# Patient Record
Sex: MALE | Race: BLACK OR AFRICAN AMERICAN | Employment: OTHER | URBAN - METROPOLITAN AREA
[De-identification: names, ages, dates, MRNs, and addresses within clinical notes are randomized per-mention and may not be internally consistent; named-entity substitution may affect disease eponyms.]

---

## 2023-03-04 PROBLEM — R35.0 URINARY FREQUENCY: Status: ACTIVE | Noted: 2023-03-04

## 2023-03-04 PROBLEM — N41.9 PROSTATITIS: Status: ACTIVE | Noted: 2023-03-04

## 2023-03-04 PROBLEM — I10 HYPERTENSION: Status: ACTIVE | Noted: 2023-03-04

## 2023-03-04 RX ORDER — CIPROFLOXACIN 500 MG/1
1 TABLET ORAL EVERY 12 HOURS
COMMUNITY
Start: 2016-05-17

## 2023-03-04 RX ORDER — HYDROCHLOROTHIAZIDE 25 MG/1
1 TABLET ORAL DAILY
COMMUNITY
Start: 2016-04-18

## 2023-10-09 ENCOUNTER — LAB (OUTPATIENT)
Dept: LAB | Facility: LAB | Age: 53
End: 2023-10-09
Payer: MEDICARE

## 2023-10-09 DIAGNOSIS — E11.65 TYPE 2 DIABETES MELLITUS WITH HYPERGLYCEMIA (MULTI): ICD-10-CM

## 2023-10-09 DIAGNOSIS — R20.2 PARESTHESIA OF SKIN: ICD-10-CM

## 2023-10-09 DIAGNOSIS — R41.3 OTHER AMNESIA: ICD-10-CM

## 2023-10-09 DIAGNOSIS — U09.9 POST COVID-19 CONDITION, UNSPECIFIED: ICD-10-CM

## 2023-10-09 DIAGNOSIS — Z79.899 OTHER LONG TERM (CURRENT) DRUG THERAPY: Primary | ICD-10-CM

## 2023-10-09 DIAGNOSIS — G93.1 ANOXIC BRAIN DAMAGE, NOT ELSEWHERE CLASSIFIED (MULTI): ICD-10-CM

## 2023-10-09 LAB
ALBUMIN SERPL-MCNC: 4.8 G/DL (ref 3.5–5)
ALBUMIN SERPL-MCNC: 5 G/DL (ref 3.5–5)
ALP BLD-CCNC: 286 U/L (ref 35–125)
ALP BLD-CCNC: 288 U/L (ref 35–125)
ALT SERPL-CCNC: 48 U/L (ref 5–40)
ALT SERPL-CCNC: 48 U/L (ref 5–40)
ANION GAP SERPL CALC-SCNC: 13 MMOL/L
ANION GAP SERPL CALC-SCNC: 14 MMOL/L
AST SERPL-CCNC: 31 U/L (ref 5–40)
AST SERPL-CCNC: 31 U/L (ref 5–40)
BASOPHILS # BLD AUTO: 0.08 X10*3/UL (ref 0–0.1)
BASOPHILS NFR BLD AUTO: 0.8 %
BILIRUB DIRECT SERPL-MCNC: <0.2 MG/DL (ref 0–0.2)
BILIRUB SERPL-MCNC: 0.4 MG/DL (ref 0.1–1.2)
BILIRUB SERPL-MCNC: 0.4 MG/DL (ref 0.1–1.2)
BUN SERPL-MCNC: 32 MG/DL (ref 8–25)
BUN SERPL-MCNC: 33 MG/DL (ref 8–25)
CALCIUM SERPL-MCNC: 9.6 MG/DL (ref 8.5–10.4)
CALCIUM SERPL-MCNC: 9.7 MG/DL (ref 8.5–10.4)
CHLORIDE SERPL-SCNC: 103 MMOL/L (ref 97–107)
CHLORIDE SERPL-SCNC: 104 MMOL/L (ref 97–107)
CHOLEST SERPL-MCNC: 307 MG/DL (ref 133–200)
CHOLEST/HDLC SERPL: 5.7 {RATIO}
CO2 SERPL-SCNC: 26 MMOL/L (ref 24–31)
CO2 SERPL-SCNC: 26 MMOL/L (ref 24–31)
CREAT SERPL-MCNC: 2.5 MG/DL (ref 0.4–1.6)
CREAT SERPL-MCNC: 2.5 MG/DL (ref 0.4–1.6)
CRP SERPL-MCNC: <0.3 MG/DL (ref 0–2)
EOSINOPHIL # BLD AUTO: 0.19 X10*3/UL (ref 0–0.7)
EOSINOPHIL NFR BLD AUTO: 1.9 %
ERYTHROCYTE [DISTWIDTH] IN BLOOD BY AUTOMATED COUNT: 13.3 % (ref 11.5–14.5)
ERYTHROCYTE [SEDIMENTATION RATE] IN BLOOD BY WESTERGREN METHOD: 33 MM/H (ref 0–20)
EST. AVERAGE GLUCOSE BLD GHB EST-MCNC: 163 MG/DL
GFR SERPL CREATININE-BSD FRML MDRD: 30 ML/MIN/1.73M*2
GFR SERPL CREATININE-BSD FRML MDRD: 30 ML/MIN/1.73M*2
GLUCOSE SERPL-MCNC: 102 MG/DL (ref 65–99)
GLUCOSE SERPL-MCNC: 104 MG/DL (ref 65–99)
HBA1C MFR BLD: 7.3 %
HCT VFR BLD AUTO: 42.5 % (ref 41–52)
HDLC SERPL-MCNC: 54 MG/DL
HGB BLD-MCNC: 13.6 G/DL (ref 13.5–17.5)
IMM GRANULOCYTES # BLD AUTO: 0.06 X10*3/UL (ref 0–0.7)
IMM GRANULOCYTES NFR BLD AUTO: 0.6 % (ref 0–0.9)
LDLC SERPL CALC-MCNC: 199 MG/DL (ref 65–130)
LYMPHOCYTES # BLD AUTO: 1.95 X10*3/UL (ref 1.2–4.8)
LYMPHOCYTES NFR BLD AUTO: 20 %
MCH RBC QN AUTO: 27.7 PG (ref 26–34)
MCHC RBC AUTO-ENTMCNC: 32 G/DL (ref 32–36)
MCV RBC AUTO: 87 FL (ref 80–100)
MONOCYTES # BLD AUTO: 1.2 X10*3/UL (ref 0.1–1)
MONOCYTES NFR BLD AUTO: 12.3 %
NEUTROPHILS # BLD AUTO: 6.28 X10*3/UL (ref 1.2–7.7)
NEUTROPHILS NFR BLD AUTO: 64.4 %
NRBC BLD-RTO: 0 /100 WBCS (ref 0–0)
PLATELET # BLD AUTO: 391 X10*3/UL (ref 150–450)
PMV BLD AUTO: 10.5 FL (ref 7.5–11.5)
POTASSIUM SERPL-SCNC: 3.8 MMOL/L (ref 3.4–5.1)
POTASSIUM SERPL-SCNC: 3.9 MMOL/L (ref 3.4–5.1)
PROT SERPL-MCNC: 7.9 G/DL (ref 5.9–7.9)
PROT SERPL-MCNC: 8 G/DL (ref 5.9–7.9)
RBC # BLD AUTO: 4.91 X10*6/UL (ref 4.5–5.9)
SODIUM SERPL-SCNC: 143 MMOL/L (ref 133–145)
SODIUM SERPL-SCNC: 143 MMOL/L (ref 133–145)
TRIGL SERPL-MCNC: 271 MG/DL (ref 40–150)
TSH SERPL DL<=0.05 MIU/L-ACNC: 4.09 MIU/L (ref 0.27–4.2)
VIT B12 SERPL-MCNC: 505 PG/ML (ref 211–946)
WBC # BLD AUTO: 9.8 X10*3/UL (ref 4.4–11.3)

## 2023-10-09 PROCEDURE — 86803 HEPATITIS C AB TEST: CPT

## 2023-10-09 PROCEDURE — 84443 ASSAY THYROID STIM HORMONE: CPT

## 2023-10-09 PROCEDURE — 82248 BILIRUBIN DIRECT: CPT

## 2023-10-09 PROCEDURE — 85025 COMPLETE CBC W/AUTO DIFF WBC: CPT

## 2023-10-09 PROCEDURE — 86481 TB AG RESPONSE T-CELL SUSP: CPT

## 2023-10-09 PROCEDURE — 86704 HEP B CORE ANTIBODY TOTAL: CPT

## 2023-10-09 PROCEDURE — 83036 HEMOGLOBIN GLYCOSYLATED A1C: CPT

## 2023-10-09 PROCEDURE — 86140 C-REACTIVE PROTEIN: CPT

## 2023-10-09 PROCEDURE — 86038 ANTINUCLEAR ANTIBODIES: CPT

## 2023-10-09 PROCEDURE — 36415 COLL VENOUS BLD VENIPUNCTURE: CPT

## 2023-10-09 PROCEDURE — 86706 HEP B SURFACE ANTIBODY: CPT

## 2023-10-09 PROCEDURE — 80061 LIPID PANEL: CPT

## 2023-10-09 PROCEDURE — 82607 VITAMIN B-12: CPT

## 2023-10-09 PROCEDURE — 85652 RBC SED RATE AUTOMATED: CPT

## 2023-10-09 PROCEDURE — 80053 COMPREHEN METABOLIC PANEL: CPT

## 2023-10-09 PROCEDURE — 87350 HEPATITIS BE AG IA: CPT

## 2023-10-10 LAB
ANA SER QL HEP2 SUBST: NEGATIVE
HBV CORE AB SER QL: NONREACTIVE
HBV SURFACE AB SER-ACNC: 17.5 MIU/ML
HCV AB SER QL: NONREACTIVE

## 2023-10-11 LAB
HBV E AG SERPL QL IA: NEGATIVE
NIL(NEG) CONTROL SPOT COUNT: NORMAL
PANEL A SPOT COUNT: 0
PANEL B SPOT COUNT: 0
POS CONTROL SPOT COUNT: NORMAL
T-SPOT. TB INTERPRETATION: NEGATIVE

## 2023-11-01 ENCOUNTER — HOSPITAL ENCOUNTER (OUTPATIENT)
Dept: RADIOLOGY | Facility: CLINIC | Age: 53
Discharge: HOME | End: 2023-11-01
Payer: MEDICARE

## 2023-11-01 ENCOUNTER — OFFICE VISIT (OUTPATIENT)
Dept: ORTHOPEDIC SURGERY | Facility: CLINIC | Age: 53
End: 2023-11-01
Payer: MEDICARE

## 2023-11-01 VITALS — BODY MASS INDEX: 41.65 KG/M2 | WEIGHT: 250 LBS | HEIGHT: 65 IN

## 2023-11-01 DIAGNOSIS — M79.672 LEFT FOOT PAIN: ICD-10-CM

## 2023-11-01 DIAGNOSIS — M19.072 OSTEOARTHRITIS OF LEFT ANKLE OR FOOT: ICD-10-CM

## 2023-11-01 DIAGNOSIS — M25.562 LEFT KNEE PAIN, UNSPECIFIED CHRONICITY: ICD-10-CM

## 2023-11-01 DIAGNOSIS — M17.12 PRIMARY OSTEOARTHRITIS OF LEFT KNEE: ICD-10-CM

## 2023-11-01 DIAGNOSIS — M25.572 LEFT ANKLE PAIN, UNSPECIFIED CHRONICITY: ICD-10-CM

## 2023-11-01 DIAGNOSIS — M25.562 LEFT KNEE PAIN, UNSPECIFIED CHRONICITY: Primary | ICD-10-CM

## 2023-11-01 DIAGNOSIS — M19.072 OSTEOARTHRITIS OF LEFT ANKLE AND FOOT: ICD-10-CM

## 2023-11-01 PROBLEM — M19.079 OSTEOARTHRITIS OF ANKLE OR FOOT: Status: ACTIVE | Noted: 2023-11-01

## 2023-11-01 PROCEDURE — 73630 X-RAY EXAM OF FOOT: CPT | Mod: LT,FY

## 2023-11-01 PROCEDURE — 73560 X-RAY EXAM OF KNEE 1 OR 2: CPT | Mod: LT

## 2023-11-01 PROCEDURE — 99203 OFFICE O/P NEW LOW 30 MIN: CPT | Performed by: ORTHOPAEDIC SURGERY

## 2023-11-01 PROCEDURE — 73610 X-RAY EXAM OF ANKLE: CPT | Mod: LT

## 2023-11-01 PROCEDURE — 1036F TOBACCO NON-USER: CPT | Performed by: ORTHOPAEDIC SURGERY

## 2023-11-01 PROCEDURE — 99213 OFFICE O/P EST LOW 20 MIN: CPT | Performed by: ORTHOPAEDIC SURGERY

## 2023-11-01 ASSESSMENT — PAIN - FUNCTIONAL ASSESSMENT: PAIN_FUNCTIONAL_ASSESSMENT: 0-10

## 2023-11-01 ASSESSMENT — PATIENT HEALTH QUESTIONNAIRE - PHQ9
1. LITTLE INTEREST OR PLEASURE IN DOING THINGS: NOT AT ALL
2. FEELING DOWN, DEPRESSED OR HOPELESS: NOT AT ALL
2. FEELING DOWN, DEPRESSED OR HOPELESS: NOT AT ALL
SUM OF ALL RESPONSES TO PHQ9 QUESTIONS 1 AND 2: 0
SUM OF ALL RESPONSES TO PHQ9 QUESTIONS 1 AND 2: 0
1. LITTLE INTEREST OR PLEASURE IN DOING THINGS: NOT AT ALL

## 2023-11-01 ASSESSMENT — ENCOUNTER SYMPTOMS
OCCASIONAL FEELINGS OF UNSTEADINESS: 0
LOSS OF SENSATION IN FEET: 0
DEPRESSION: 0

## 2023-11-01 ASSESSMENT — PAIN DESCRIPTION - DESCRIPTORS: DESCRIPTORS: STABBING;SHOOTING

## 2023-11-01 ASSESSMENT — PAIN SCALES - GENERAL: PAINLEVEL_OUTOF10: 10 - WORST POSSIBLE PAIN

## 2023-11-01 NOTE — PROGRESS NOTES
Subjective      Chief Complaint   Patient presents with    Left Foot - Pain        No surgery found     HPI  This 53 year old patient presents today with  left knee pain. The patient states that this left knee pain has been present for the past several weeks. The patient rates the knee pain as 8. The patient states that knee pain is worse with and activity and bearing weight. He also complains of left foot and ankle pain worse with and aggravated by prolonged walking. The patient has tried ibuprofen and tylenol with no relief. Patient requests a discussion of further treatment options on examination today.     CARDIOLOGY:   Negative for chest pain, shortness of breath.   RESPIRATORY:   Negative for chest pain, shortness of breath.   MUSCULOSKELETAL:   See HPI for details.   NEUROLOGY:   Negative for tingling, numbness, weakness.    Objective    There were no vitals filed for this visit.    Knee Exam  Constitutional: Appears stated age. No apparent distress  Labored Breathing: No  Psychiatric: Normal mood and effect.   Neurological: alert and oriented x3  Skin: intact  MUSCULOSKELETAL: Neck: No tenderness. No pain or limitation with range of motion. Back: No tenderness. Straight leg test negative bilaterally. left knee: There is diffuse tenderness over the knee. full range of motion McMurrays is negative. Anterior drawer and lachmans are negative. There is not an effusion present. The knee is stable to valgus and varus stressing. Left ankle and foot: nontender. No pain with gentle ROM. [place] is negative and equal bilaterally. Nontender in the left calf. The patient walks with a painful gait favoring the left knee while walking.  Xrays of the left knee, left ankle and left foot all done and read in the office today are reviewed with the patient in the office today and show mild osteoarthritis of the left knee, left ankle and left foot.  No acute fracture is evident.    No images are attached to the  encounter.    Kali was seen today for pain.  Diagnoses and all orders for this visit:  Left knee pain, unspecified chronicity (Primary)  -     XR knee left 1-2 views; Future  Left ankle pain, unspecified chronicity  -     XR ankle left 3+ views; Future  Left foot pain  -     XR foot left 3+ views; Future  Primary osteoarthritis of left knee  -     Referral to Physical Therapy; Future  Osteoarthritis of left ankle or foot  -     Referral to Physical Therapy; Future  Osteoarthritis of left ankle and foot  -     Referral to Physical Therapy; Future  Options are discussed with the patient in detail. The patient is given a prescription for physical therapy to evaluate and treat with gentle strengthening and ROM exercises with modalities as needed. The patient is instructed regarding activity modification and risk for further injury with falling or trauma, ice, physician directed at home gentle strengthening and ROM exercises, and the appropriate use of Tylenol as needed for pain with its potential adverse reactions and side effects. The patient understands. Please note that this report has been produced using speech recognition software.  It may contain errors related to grammar, punctuation or spelling.  Electronically signed, but not reviewed.    Jose C Noble MD

## 2023-11-06 ENCOUNTER — EVALUATION (OUTPATIENT)
Dept: PHYSICAL THERAPY | Facility: CLINIC | Age: 53
End: 2023-11-06
Payer: MEDICARE

## 2023-11-06 DIAGNOSIS — M79.672 LEFT FOOT PAIN: ICD-10-CM

## 2023-11-06 DIAGNOSIS — M17.12 PRIMARY OSTEOARTHRITIS OF LEFT KNEE: ICD-10-CM

## 2023-11-06 DIAGNOSIS — M25.572 LEFT ANKLE PAIN: ICD-10-CM

## 2023-11-06 DIAGNOSIS — M19.072 OSTEOARTHRITIS OF LEFT ANKLE OR FOOT: ICD-10-CM

## 2023-11-06 DIAGNOSIS — M25.562 LEFT KNEE PAIN: ICD-10-CM

## 2023-11-06 DIAGNOSIS — M19.072 OSTEOARTHRITIS OF LEFT ANKLE AND FOOT: ICD-10-CM

## 2023-11-06 PROCEDURE — 97162 PT EVAL MOD COMPLEX 30 MIN: CPT | Mod: GP | Performed by: PHYSICAL THERAPIST
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
## 2023-11-06 NOTE — LETTER
November 6, 2023     Patient: Kali Montelongo   YOB: 1970   Date of Visit: 11/6/2023       To Whom It May Concern:    It is my medical opinion that Kali Montelongo {Work release (duty restriction):39342}.    If you have any questions or concerns, please don't hesitate to call.         Sincerely,        Erika Lock, PT    CC: No Recipients

## 2023-11-06 NOTE — LETTER
November 6, 2023     Patient: Kali Montelongo   YOB: 1970   Date of Visit: 11/6/2023       To Whom it May Concern:    Kali Montelongo was seen in my clinic on 11/6/2023. He {Return to school/sport:28897}.    If you have any questions or concerns, please don't hesitate to call.         Sincerely,          Erika Lock, PT        CC: No Recipients

## 2023-11-27 ENCOUNTER — HOSPITAL ENCOUNTER (EMERGENCY)
Facility: HOSPITAL | Age: 53
Discharge: HOME | End: 2023-11-27
Attending: EMERGENCY MEDICINE
Payer: MEDICARE

## 2023-11-27 ENCOUNTER — APPOINTMENT (OUTPATIENT)
Dept: RADIOLOGY | Facility: HOSPITAL | Age: 53
End: 2023-11-27
Payer: MEDICARE

## 2023-11-27 VITALS
SYSTOLIC BLOOD PRESSURE: 138 MMHG | OXYGEN SATURATION: 98 % | BODY MASS INDEX: 41.65 KG/M2 | HEART RATE: 65 BPM | WEIGHT: 250 LBS | DIASTOLIC BLOOD PRESSURE: 85 MMHG | HEIGHT: 65 IN | RESPIRATION RATE: 16 BRPM | TEMPERATURE: 99 F

## 2023-11-27 DIAGNOSIS — I10 HYPERTENSION, UNSPECIFIED TYPE: ICD-10-CM

## 2023-11-27 DIAGNOSIS — N18.9 CHRONIC KIDNEY DISEASE, UNSPECIFIED CKD STAGE: ICD-10-CM

## 2023-11-27 DIAGNOSIS — R74.01 TRANSAMINITIS: ICD-10-CM

## 2023-11-27 DIAGNOSIS — R06.00 DYSPNEA, UNSPECIFIED TYPE: ICD-10-CM

## 2023-11-27 DIAGNOSIS — R07.2 SUBSTERNAL CHEST PAIN: Primary | ICD-10-CM

## 2023-11-27 LAB
ALBUMIN SERPL-MCNC: 4.5 G/DL (ref 3.5–5)
ALP BLD-CCNC: 322 U/L (ref 35–125)
ALT SERPL-CCNC: 96 U/L (ref 5–40)
ANION GAP SERPL CALC-SCNC: 11 MMOL/L
AST SERPL-CCNC: 50 U/L (ref 5–40)
BASOPHILS # BLD AUTO: 0.11 X10*3/UL (ref 0–0.1)
BASOPHILS NFR BLD AUTO: 0.9 %
BILIRUB SERPL-MCNC: 0.4 MG/DL (ref 0.1–1.2)
BUN SERPL-MCNC: 35 MG/DL (ref 8–25)
CALCIUM SERPL-MCNC: 9.7 MG/DL (ref 8.5–10.4)
CHLORIDE SERPL-SCNC: 103 MMOL/L (ref 97–107)
CO2 SERPL-SCNC: 26 MMOL/L (ref 24–31)
CREAT SERPL-MCNC: 2.5 MG/DL (ref 0.4–1.6)
EOSINOPHIL # BLD AUTO: 0.32 X10*3/UL (ref 0–0.7)
EOSINOPHIL NFR BLD AUTO: 2.6 %
ERYTHROCYTE [DISTWIDTH] IN BLOOD BY AUTOMATED COUNT: 15.2 % (ref 11.5–14.5)
GFR SERPL CREATININE-BSD FRML MDRD: 30 ML/MIN/1.73M*2
GLUCOSE SERPL-MCNC: 96 MG/DL (ref 65–99)
HCT VFR BLD AUTO: 43.6 % (ref 41–52)
HGB BLD-MCNC: 13.9 G/DL (ref 13.5–17.5)
IMM GRANULOCYTES # BLD AUTO: 0.19 X10*3/UL (ref 0–0.7)
IMM GRANULOCYTES NFR BLD AUTO: 1.5 % (ref 0–0.9)
LYMPHOCYTES # BLD AUTO: 2.1 X10*3/UL (ref 1.2–4.8)
LYMPHOCYTES NFR BLD AUTO: 17.1 %
MCH RBC QN AUTO: 27.7 PG (ref 26–34)
MCHC RBC AUTO-ENTMCNC: 31.9 G/DL (ref 32–36)
MCV RBC AUTO: 87 FL (ref 80–100)
MONOCYTES # BLD AUTO: 1.36 X10*3/UL (ref 0.1–1)
MONOCYTES NFR BLD AUTO: 11.1 %
NEUTROPHILS # BLD AUTO: 8.19 X10*3/UL (ref 1.2–7.7)
NEUTROPHILS NFR BLD AUTO: 66.8 %
NRBC BLD-RTO: 0 /100 WBCS (ref 0–0)
NT-PROBNP SERPL-MCNC: 92 PG/ML (ref 0–177)
PLATELET # BLD AUTO: 404 X10*3/UL (ref 150–450)
POTASSIUM SERPL-SCNC: 4.3 MMOL/L (ref 3.4–5.1)
PROT SERPL-MCNC: 7.9 G/DL (ref 5.9–7.9)
RBC # BLD AUTO: 5.02 X10*6/UL (ref 4.5–5.9)
SARS-COV-2 RNA RESP QL NAA+PROBE: NOT DETECTED
SODIUM SERPL-SCNC: 140 MMOL/L (ref 133–145)
TROPONIN T SERPL-MCNC: 14 NG/L
TROPONIN T SERPL-MCNC: 17 NG/L
WBC # BLD AUTO: 12.3 X10*3/UL (ref 4.4–11.3)

## 2023-11-27 PROCEDURE — 85025 COMPLETE CBC W/AUTO DIFF WBC: CPT | Performed by: EMERGENCY MEDICINE

## 2023-11-27 PROCEDURE — 99284 EMERGENCY DEPT VISIT MOD MDM: CPT | Performed by: EMERGENCY MEDICINE

## 2023-11-27 PROCEDURE — 2500000001 HC RX 250 WO HCPCS SELF ADMINISTERED DRUGS (ALT 637 FOR MEDICARE OP): Performed by: EMERGENCY MEDICINE

## 2023-11-27 PROCEDURE — 87635 SARS-COV-2 COVID-19 AMP PRB: CPT | Performed by: EMERGENCY MEDICINE

## 2023-11-27 PROCEDURE — 71045 X-RAY EXAM CHEST 1 VIEW: CPT

## 2023-11-27 PROCEDURE — 84484 ASSAY OF TROPONIN QUANT: CPT | Performed by: EMERGENCY MEDICINE

## 2023-11-27 PROCEDURE — 36415 COLL VENOUS BLD VENIPUNCTURE: CPT | Performed by: EMERGENCY MEDICINE

## 2023-11-27 PROCEDURE — 80053 COMPREHEN METABOLIC PANEL: CPT | Performed by: EMERGENCY MEDICINE

## 2023-11-27 PROCEDURE — 83880 ASSAY OF NATRIURETIC PEPTIDE: CPT | Performed by: EMERGENCY MEDICINE

## 2023-11-27 PROCEDURE — 99283 EMERGENCY DEPT VISIT LOW MDM: CPT | Mod: 25

## 2023-11-27 PROCEDURE — 94760 N-INVAS EAR/PLS OXIMETRY 1: CPT

## 2023-11-27 RX ORDER — ASPIRIN 325 MG
325 TABLET ORAL ONCE
Status: COMPLETED | OUTPATIENT
Start: 2023-11-27 | End: 2023-11-27

## 2023-11-27 RX ORDER — ONDANSETRON HYDROCHLORIDE 2 MG/ML
4 INJECTION, SOLUTION INTRAVENOUS ONCE
Status: DISCONTINUED | OUTPATIENT
Start: 2023-11-27 | End: 2023-11-27 | Stop reason: HOSPADM

## 2023-11-27 RX ORDER — MORPHINE SULFATE 4 MG/ML
4 INJECTION, SOLUTION INTRAMUSCULAR; INTRAVENOUS ONCE
Status: DISCONTINUED | OUTPATIENT
Start: 2023-11-27 | End: 2023-11-27 | Stop reason: HOSPADM

## 2023-11-27 RX ADMIN — ASPIRIN 325 MG: 325 TABLET ORAL at 13:06

## 2023-11-27 ASSESSMENT — PAIN - FUNCTIONAL ASSESSMENT: PAIN_FUNCTIONAL_ASSESSMENT: 0-10

## 2023-11-27 ASSESSMENT — LIFESTYLE VARIABLES
REASON UNABLE TO ASSESS: NO
HAVE YOU EVER FELT YOU SHOULD CUT DOWN ON YOUR DRINKING: NO
HAVE PEOPLE ANNOYED YOU BY CRITICIZING YOUR DRINKING: NO
EVER HAD A DRINK FIRST THING IN THE MORNING TO STEADY YOUR NERVES TO GET RID OF A HANGOVER: NO
EVER FELT BAD OR GUILTY ABOUT YOUR DRINKING: NO

## 2023-11-27 ASSESSMENT — COLUMBIA-SUICIDE SEVERITY RATING SCALE - C-SSRS
6. HAVE YOU EVER DONE ANYTHING, STARTED TO DO ANYTHING, OR PREPARED TO DO ANYTHING TO END YOUR LIFE?: NO
1. IN THE PAST MONTH, HAVE YOU WISHED YOU WERE DEAD OR WISHED YOU COULD GO TO SLEEP AND NOT WAKE UP?: NO
2. HAVE YOU ACTUALLY HAD ANY THOUGHTS OF KILLING YOURSELF?: NO

## 2023-11-27 ASSESSMENT — PAIN SCALES - GENERAL: PAINLEVEL_OUTOF10: 0 - NO PAIN

## 2023-11-27 NOTE — DISCHARGE INSTRUCTIONS
Follow up with your primary care physician within 1 to 2 days for further management of your current symptoms and repeat check of your blood pressure.      Follow-up with cardiology within 1 week      Return to the emergency department sooner with worsening of symptoms or onset of new symptoms

## 2023-11-27 NOTE — ED PROVIDER NOTES
HPI   No chief complaint on file.      HPI                    No data recorded                Patient History   Past Medical History:   Diagnosis Date    Cardiac arrest (CMS/HCC)     Diabetes mellitus (CMS/HCC)     Foot pain, left     Hypertension     MI (myocardial infarction) (CMS/HCC)     Personal history of other diseases of the digestive system     History of esophageal reflux    Pneumonia      Past Surgical History:   Procedure Laterality Date    COLONOSCOPY  05/10/2016    Complete Colonoscopy     Family History   Problem Relation Name Age of Onset    Hypertension Mother      Prostate cancer Maternal Grandfather       Social History     Tobacco Use    Smoking status: Never    Smokeless tobacco: Never   Vaping Use    Vaping Use: Never used   Substance Use Topics    Alcohol use: Not Currently    Drug use: Never       Physical Exam   ED Triage Vitals [11/27/23 1212]   Temp Heart Rate Resp BP   37.2 °C (99 °F) 67 16 (!) 171/102      SpO2 Temp Source Heart Rate Source Patient Position   98 % Temporal Monitor Sitting      BP Location FiO2 (%)     Left arm --       Physical Exam    ED Course & MDM   Diagnoses as of 11/27/23 1537   Substernal chest pain   Hypertension, unspecified type   Dyspnea, unspecified type   Chronic kidney disease, unspecified CKD stage   Transaminitis       Medical Decision Making    The patient is a 53-year-old male presenting to the emergency department for evaluation of substernal chest pain and increasing shortness of breath.  The patient reportedly has had worsening symptoms over the past week or so.  The pain is a dull aching pain.  No better or worse.  No radiation.  It is constant.  He denies any cough or congestion.  No fever or chills.  No palpitations.  No diaphoresis.  No abdominal pain.  No nausea or vomiting.  No diarrhea or constipation but no urinary complaints.  The patient denies any history of CAD or ACS.  He does have a history of chronic kidney disease which she reports  "was subsequent to COVID infection several years ago.  He also has a history of hyperlipidemia.  He states that he has been told that he has \"prediabetes\" but not diabetes.  All pertinent positives and negatives are recorded above.  All other systems reviewed and otherwise negative.  Vital signs with hypertension but otherwise within normal limits.  Physical exam with a well-nourished well-developed male in no acute distress.  HEENT exam within normal limits.  He has no evidence of airway compromise or respiratory distress.  Abdominal exam is benign.  He has no gross motor, neurologic or vascular deficits on exam.      EKG with normal sinus rhythm at 66 bpm, normal axis, normal voltage, normal ST segment, normal T waves      Oral aspirin, IV morphine, IV Zofran and IV Pepcid ordered.      Diagnostic labs with chronic renal insufficiency, mildly elevated transaminases, mild leukocytosis, but otherwise unremarkable.      Initial Troponin T 17.  Repeat Troponin T 14. Delta trop T 3      Heart score 3      XR chest 1 view   Final Result   No acute cardiopulmonary disease.        Signed by: Kali Kendall 11/27/2023 12:31 PM   Dictation workstation:   [id number]           The patient does not have any evidence of ischemia on EKG or cardiac enzymes.  No events on telemetry.  Chest x-ray without evidence of pneumonia or pneumothorax.  No evidence of CHF.  The patient does not have any evidence of airway compromise or respiratory distress on exam.  The patient does have a history of chronic renal failure but he is at his baseline creatinine.      The patient was released in good condition.  He was instructed to follow-up with his primary care physician within 1 to 2 days for further management of his current symptoms.  He was also given a referral to cardiology for further management of his chest pain.  He will return to the emergency department sooner with worsening of symptoms or onset of new " symptoms.      Diagnosis/impression  Chest pain, substernal  Dyspnea  Hypertension, unspecified  Elevated transaminases  5.  Chronic renal failure    I reviewed the results of the diagnostic labs and diagnostic imaging.  Formal radiology reading was completed by the radiologist  Procedure  Procedures     Fabi Baez MD  11/27/23 3151

## 2023-11-27 NOTE — Clinical Note
Kali Montelongo was seen and treated in our emergency department on 11/27/2023.  He may return to work on 11/28/2023.       If you have any questions or concerns, please don't hesitate to call.      Fabi Baez MD
Yes

## 2023-11-28 ENCOUNTER — HOSPITAL ENCOUNTER (OUTPATIENT)
Dept: CARDIOLOGY | Facility: HOSPITAL | Age: 53
Discharge: HOME | End: 2023-11-28
Payer: MEDICARE

## 2023-11-28 LAB
ATRIAL RATE: 66 BPM
P AXIS: 77 DEGREES
P OFFSET: 191 MS
P ONSET: 135 MS
PR INTERVAL: 174 MS
Q ONSET: 222 MS
QRS COUNT: 11 BEATS
QRS DURATION: 76 MS
QT INTERVAL: 382 MS
QTC CALCULATION(BAZETT): 400 MS
QTC FREDERICIA: 394 MS
R AXIS: 55 DEGREES
T AXIS: 95 DEGREES
T OFFSET: 413 MS
VENTRICULAR RATE: 66 BPM

## 2023-11-28 PROCEDURE — 93005 ELECTROCARDIOGRAM TRACING: CPT

## 2023-12-19 ENCOUNTER — OFFICE VISIT (OUTPATIENT)
Dept: PODIATRY | Facility: CLINIC | Age: 53
End: 2023-12-19
Payer: MEDICARE

## 2023-12-19 DIAGNOSIS — M79.671 PAIN IN BOTH FEET: ICD-10-CM

## 2023-12-19 DIAGNOSIS — M79.672 PAIN IN BOTH FEET: ICD-10-CM

## 2023-12-19 DIAGNOSIS — B35.3 TINEA PEDIS OF BOTH FEET: Primary | ICD-10-CM

## 2023-12-19 DIAGNOSIS — B35.1 ONYCHOMYCOSIS: ICD-10-CM

## 2023-12-19 PROCEDURE — 1036F TOBACCO NON-USER: CPT | Performed by: PODIATRIST

## 2023-12-19 PROCEDURE — 99203 OFFICE O/P NEW LOW 30 MIN: CPT | Performed by: PODIATRIST

## 2023-12-19 RX ORDER — CLOTRIMAZOLE AND BETAMETHASONE DIPROPIONATE 10; .64 MG/G; MG/G
1 CREAM TOPICAL 2 TIMES DAILY
Qty: 45 G | Refills: 2 | Status: SHIPPED | OUTPATIENT
Start: 2023-12-19 | End: 2024-01-16

## 2023-12-19 RX ORDER — CLOTRIMAZOLE AND BETAMETHASONE DIPROPIONATE 10; .64 MG/G; MG/G
1 CREAM TOPICAL ONCE
Status: DISCONTINUED | OUTPATIENT
Start: 2023-12-19 | End: 2023-12-19

## 2023-12-19 RX ORDER — CLOTRIMAZOLE AND BETAMETHASONE DIPROPIONATE 10; .64 MG/G; MG/G
1 CREAM TOPICAL 2 TIMES DAILY
Qty: 45 G | Refills: 2 | Status: SHIPPED | OUTPATIENT
Start: 2023-12-19 | End: 2023-12-19

## 2023-12-19 NOTE — PROGRESS NOTES
History of Present Illness:   Patient states they are here for foot exam  Has fungal toes and athlete foot  Denies DM  NO other pedal complaints    Past Medical History  Past Medical History:   Diagnosis Date    Cardiac arrest (CMS/[facility])     Diabetes mellitus (CMS/[facility])     Foot pain, left     Hypertension     MI (myocardial infarction) (CMS/[facility])     Personal history of other diseases of the digestive system     History of esophageal reflux    Pneumonia        Medications and Allergies have been reviewed.    Review Of Systems:  GENERAL: No weight loss, malaise or fevers.  HEENT: Negative for frequent or significant headaches,   RESPIRATORY: Negative for cough, wheezing or shortness of breath.  CARDIOVASCULAR: Negative for chest pain, leg swelling or palpitations.    Examination of Both Lower Extremities:   Objective:   Vasc: DP and PT pulses are palpable bilateral.  CFT is less than 3 seconds bilateral.  Skin temperature is warm to cool proximal to distal bilateral.      Neuro: Vibratory, light touch and proprioception are intact bilateral.      Derm: Nails 1-5 bilateral are intact thick and discolored.   Skin is supple with normal texture and turgor noted.  Webspaces are clean, dry and intact bilateral.  Xerosis noted to b/l plantar feet    Ortho: Muscle strength is 5/5 for all pedal groups tested.      1. Tinea pedis of both feet  clotrimazole-betamethasone (Lotrisone) cream    DISCONTINUED: clotrimazole-betamethasone (Lotrisone) cream    DISCONTINUED: clotrimazole-betamethasone (Lotrisone) cream 1 Application      2. Onychomycosis        3. Pain in both feet          Patient exam and eval  Discussed topical and oral antifungal  Deferred oral antifungal bc Nov CMP was abnormal  Called in topical for athlete feet  Fu prn  Patient was in agreement to this plan. All questions answered.      Erika Edwards DPM  [phone]  Option 2  Fax: [phone]

## 2024-01-07 ENCOUNTER — HOSPITAL ENCOUNTER (OUTPATIENT)
Dept: RADIOLOGY | Facility: HOSPITAL | Age: 54
Discharge: HOME | End: 2024-01-07
Payer: MEDICARE

## 2024-01-07 DIAGNOSIS — R10.84 GENERALIZED ABDOMINAL PAIN: ICD-10-CM

## 2024-01-07 PROCEDURE — 76700 US EXAM ABDOM COMPLETE: CPT

## 2024-01-28 ENCOUNTER — HOSPITAL ENCOUNTER (OUTPATIENT)
Dept: RADIOLOGY | Facility: HOSPITAL | Age: 54
Discharge: HOME | End: 2024-01-28
Payer: MEDICARE

## 2024-01-28 DIAGNOSIS — R93.89 ABNORMAL FINDINGS ON DIAGNOSTIC IMAGING OF OTHER SPECIFIED BODY STRUCTURES: ICD-10-CM

## 2024-01-28 DIAGNOSIS — K76.0 FATTY (CHANGE OF) LIVER, NOT ELSEWHERE CLASSIFIED: ICD-10-CM

## 2024-01-28 PROCEDURE — 74181 MRI ABDOMEN W/O CONTRAST: CPT

## 2024-01-28 PROCEDURE — 76981 USE PARENCHYMA: CPT

## 2024-02-19 ENCOUNTER — DOCUMENTATION (OUTPATIENT)
Dept: PHYSICAL THERAPY | Facility: CLINIC | Age: 54
End: 2024-02-19

## 2024-02-19 DIAGNOSIS — L29.9 PRURITUS, UNSPECIFIED: ICD-10-CM

## 2024-02-19 DIAGNOSIS — F41.9 ANXIETY DISORDER, UNSPECIFIED: Primary | ICD-10-CM

## 2024-02-19 NOTE — PROGRESS NOTES
"Physical Therapy    Physical Therapy Discharge Summary    Patient Name: Kali Montelongo  MRN: [id number]  : 1970  Date: 2024    Referring Provider: Jose C Noble MD  Medical Diagnosis: Primary OA of L knee, OA of L ankle, OA of L foot  Therapy Diagnosis: L knee, foot, and, and ankle pain    Date of evaluation: 24  Number of visits attended: 1  Date of last visit: 23    Current treatment has consisted of the following: Education/Instruction  and Therapeutic exercise      Therapy Summary: Pt was evaluated and issued an individualized HEP. Pt required further authorization from insurance for follow up visits. Per insurance, \"claims for services rendered while member is in davin period will be pended until member is in good standing.\"    Rehab Discharge Reason: authorization not received/no follow up visits scheduled    "

## 2024-04-29 ENCOUNTER — TELEPHONE (OUTPATIENT)
Dept: PRIMARY CARE | Facility: CLINIC | Age: 54
End: 2024-04-29

## 2024-05-20 DIAGNOSIS — I10 ESSENTIAL (PRIMARY) HYPERTENSION: ICD-10-CM

## 2024-05-20 DIAGNOSIS — E11.65 TYPE 2 DIABETES MELLITUS WITH HYPERGLYCEMIA (MULTI): Primary | ICD-10-CM

## 2024-05-21 ENCOUNTER — LAB (OUTPATIENT)
Dept: LAB | Facility: LAB | Age: 54
End: 2024-05-21

## 2024-05-21 DIAGNOSIS — L29.9 PRURITUS, UNSPECIFIED: ICD-10-CM

## 2024-05-21 DIAGNOSIS — F41.9 ANXIETY DISORDER, UNSPECIFIED: ICD-10-CM

## 2024-05-21 DIAGNOSIS — I10 ESSENTIAL (PRIMARY) HYPERTENSION: ICD-10-CM

## 2024-05-21 DIAGNOSIS — E11.65 TYPE 2 DIABETES MELLITUS WITH HYPERGLYCEMIA (MULTI): Primary | ICD-10-CM

## 2024-05-21 LAB
ALBUMIN SERPL-MCNC: 4.4 G/DL (ref 3.5–5)
ALP BLD-CCNC: 281 U/L (ref 35–125)
ALT SERPL-CCNC: 45 U/L (ref 5–40)
ANION GAP SERPL CALC-SCNC: 15 MMOL/L
AST SERPL-CCNC: 45 U/L (ref 5–40)
BASOPHILS # BLD AUTO: 0.07 X10*3/UL (ref 0–0.1)
BASOPHILS NFR BLD AUTO: 0.9 %
BILIRUB SERPL-MCNC: 0.5 MG/DL (ref 0.1–1.2)
BUN SERPL-MCNC: 26 MG/DL (ref 8–25)
CALCIUM SERPL-MCNC: 9.7 MG/DL (ref 8.5–10.4)
CHLORIDE SERPL-SCNC: 104 MMOL/L (ref 97–107)
CO2 SERPL-SCNC: 25 MMOL/L (ref 24–31)
CREAT SERPL-MCNC: 2.6 MG/DL (ref 0.4–1.6)
CREAT UR-MCNC: 101.1 MG/DL
EGFRCR SERPLBLD CKD-EPI 2021: 28 ML/MIN/1.73M*2
EOSINOPHIL # BLD AUTO: 0.15 X10*3/UL (ref 0–0.7)
EOSINOPHIL NFR BLD AUTO: 1.8 %
ERYTHROCYTE [DISTWIDTH] IN BLOOD BY AUTOMATED COUNT: 14.1 % (ref 11.5–14.5)
ERYTHROCYTE [SEDIMENTATION RATE] IN BLOOD BY WESTERGREN METHOD: 33 MM/H (ref 0–20)
EST. AVERAGE GLUCOSE BLD GHB EST-MCNC: 171 MG/DL
GLUCOSE SERPL-MCNC: 138 MG/DL (ref 65–99)
HBA1C MFR BLD: 7.6 %
HBV SURFACE AG SERPL QL IA: NONREACTIVE
HCT VFR BLD AUTO: 39.9 % (ref 41–52)
HCV AB SER QL: NONREACTIVE
HGB BLD-MCNC: 13 G/DL (ref 13.5–17.5)
HIV 1+2 AB+HIV1 P24 AG SERPL QL IA: NONREACTIVE
IMM GRANULOCYTES # BLD AUTO: 0.02 X10*3/UL (ref 0–0.7)
IMM GRANULOCYTES NFR BLD AUTO: 0.2 % (ref 0–0.9)
LYMPHOCYTES # BLD AUTO: 1.14 X10*3/UL (ref 1.2–4.8)
LYMPHOCYTES NFR BLD AUTO: 14 %
MCH RBC QN AUTO: 27.6 PG (ref 26–34)
MCHC RBC AUTO-ENTMCNC: 32.6 G/DL (ref 32–36)
MCV RBC AUTO: 85 FL (ref 80–100)
MICROALBUMIN UR-MCNC: 146 MG/L (ref 0–23)
MICROALBUMIN/CREAT UR: 144.4 UG/MG CREAT
MONOCYTES # BLD AUTO: 1.15 X10*3/UL (ref 0.1–1)
MONOCYTES NFR BLD AUTO: 14.1 %
NEUTROPHILS # BLD AUTO: 5.63 X10*3/UL (ref 1.2–7.7)
NEUTROPHILS NFR BLD AUTO: 69 %
NRBC BLD-RTO: 0 /100 WBCS (ref 0–0)
PLATELET # BLD AUTO: 372 X10*3/UL (ref 150–450)
POTASSIUM SERPL-SCNC: 4 MMOL/L (ref 3.4–5.1)
PROT SERPL-MCNC: 7.3 G/DL (ref 5.9–7.9)
RBC # BLD AUTO: 4.71 X10*6/UL (ref 4.5–5.9)
RHEUMATOID FACT SER NEPH-ACNC: 13 IU/ML (ref 0–15)
SODIUM SERPL-SCNC: 144 MMOL/L (ref 133–145)
WBC # BLD AUTO: 8.2 X10*3/UL (ref 4.4–11.3)

## 2024-05-21 PROCEDURE — 80053 COMPREHEN METABOLIC PANEL: CPT

## 2024-05-21 PROCEDURE — 86780 TREPONEMA PALLIDUM: CPT

## 2024-05-21 PROCEDURE — 86431 RHEUMATOID FACTOR QUANT: CPT

## 2024-05-21 PROCEDURE — 86481 TB AG RESPONSE T-CELL SUSP: CPT

## 2024-05-21 PROCEDURE — 85025 COMPLETE CBC W/AUTO DIFF WBC: CPT

## 2024-05-21 PROCEDURE — 85652 RBC SED RATE AUTOMATED: CPT

## 2024-05-21 PROCEDURE — 86803 HEPATITIS C AB TEST: CPT

## 2024-05-21 PROCEDURE — 82043 UR ALBUMIN QUANTITATIVE: CPT

## 2024-05-21 PROCEDURE — 87389 HIV-1 AG W/HIV-1&-2 AB AG IA: CPT

## 2024-05-21 PROCEDURE — 36415 COLL VENOUS BLD VENIPUNCTURE: CPT

## 2024-05-21 PROCEDURE — 82570 ASSAY OF URINE CREATININE: CPT

## 2024-05-21 PROCEDURE — 87340 HEPATITIS B SURFACE AG IA: CPT

## 2024-05-21 PROCEDURE — 83036 HEMOGLOBIN GLYCOSYLATED A1C: CPT

## 2024-05-22 LAB — TREPONEMA PALLIDUM IGG+IGM AB [PRESENCE] IN SERUM OR PLASMA BY IMMUNOASSAY: NONREACTIVE

## 2024-05-23 LAB
NIL(NEG) CONTROL SPOT COUNT: NORMAL
PANEL A SPOT COUNT: 0
PANEL B SPOT COUNT: 0
POS CONTROL SPOT COUNT: NORMAL
T-SPOT. TB INTERPRETATION: NEGATIVE

## 2024-08-27 ENCOUNTER — APPOINTMENT (OUTPATIENT)
Dept: UROLOGY | Facility: CLINIC | Age: 54
End: 2024-08-27
Payer: MEDICARE

## 2024-08-27 VITALS — TEMPERATURE: 96.2 F | WEIGHT: 250 LBS | BODY MASS INDEX: 41.6 KG/M2

## 2024-08-27 DIAGNOSIS — Z13.1 SCREENING FOR DIABETES MELLITUS (DM): ICD-10-CM

## 2024-08-27 DIAGNOSIS — Z13.6 ENCOUNTER FOR LIPID SCREENING FOR CARDIOVASCULAR DISEASE: ICD-10-CM

## 2024-08-27 DIAGNOSIS — Z13.220 ENCOUNTER FOR LIPID SCREENING FOR CARDIOVASCULAR DISEASE: ICD-10-CM

## 2024-08-27 DIAGNOSIS — N52.9 ERECTILE DYSFUNCTION, UNSPECIFIED ERECTILE DYSFUNCTION TYPE: ICD-10-CM

## 2024-08-27 DIAGNOSIS — Z00.00 HEALTH MAINTENANCE EXAMINATION: Primary | ICD-10-CM

## 2024-08-27 PROCEDURE — 1036F TOBACCO NON-USER: CPT | Performed by: UROLOGY

## 2024-08-27 PROCEDURE — 99204 OFFICE O/P NEW MOD 45 MIN: CPT | Performed by: UROLOGY

## 2024-08-27 RX ORDER — ATENOLOL 25 MG/1
TABLET ORAL
COMMUNITY
Start: 2024-06-03

## 2024-08-27 RX ORDER — TADALAFIL 20 MG/1
20 TABLET ORAL AS NEEDED
Qty: 30 TABLET | Refills: 6 | Status: SHIPPED | OUTPATIENT
Start: 2024-08-27

## 2024-08-27 ASSESSMENT — PAIN SCALES - GENERAL: PAINLEVEL: 0-NO PAIN

## 2024-08-27 NOTE — PROGRESS NOTES
HPI:  53 y.o. male patient sp MI, with CKD and  DM complains of  erectile dysfunction. Seen commercial-self referral?  Nephrology notes reviewed    Duration: years, worse after covid in 2021  Rigidity of erections: reports better when taking cialis  /10  Morning Erections: None  s/p prostatectomy: No, prostatitis 20 years ago  On nitrates/NTG?: No  Smoker? No  Partner: wife  Tried PDE5is?:   Viagra/sildenafil - response: never tried  Cialis/tadalafil- response: cialis 5, rationing pills due to cost  Tried penile injections: no  Libido/Desire: Good  Have been on Testosterone /anabolic steroids? No  Pain or curvature in penis when erect: No  Premature or delayed ejaculation:  None    Prev. Hospital admission for covid, had STEMI and cardiac arrest.    Component      Latest Ref Rng 5/21/2024   WBC      4.4 - 11.3 x10*3/uL 8.2    nRBC      0.0 - 0.0 /100 WBCs 0.0    RBC      4.50 - 5.90 x10*6/uL 4.71    HEMOGLOBIN      13.5 - 17.5 g/dL 13.0 (L)    HEMATOCRIT      41.0 - 52.0 % 39.9 (L)    MCV      80 - 100 fL 85    MCH      26.0 - 34.0 pg 27.6    MCHC      32.0 - 36.0 g/dL 32.6    RED CELL DISTRIBUTION WIDTH      11.5 - 14.5 % 14.1    Platelets      150 - 450 x10*3/uL 372    Neutrophils %      40.0 - 80.0 % 69.0    Immature Granulocytes %, Automated      0.0 - 0.9 % 0.2    Lymphocytes %      13.0 - 44.0 % 14.0    Monocytes %      2.0 - 10.0 % 14.1    Eosinophils %      0.0 - 6.0 % 1.8    Basophils %      0.0 - 2.0 % 0.9    Neutrophils Absolute      1.20 - 7.70 x10*3/uL 5.63    Immature Granulocytes Absolute, Automated      0.00 - 0.70 x10*3/uL 0.02    Lymphocytes Absolute      1.20 - 4.80 x10*3/uL 1.14 (L)    Monocytes Absolute      0.10 - 1.00 x10*3/uL 1.15 (H)    Eosinophils Absolute      0.00 - 0.70 x10*3/uL 0.15    Basophils Absolute      0.00 - 0.10 x10*3/uL 0.07    GLUCOSE      65 - 99 mg/dL 138 (H)    SODIUM      133 - 145 mmol/L 144    POTASSIUM      3.4 - 5.1 mmol/L 4.0    CHLORIDE      97 - 107 mmol/L 104     Bicarbonate      24 - 31 mmol/L 25    Blood Urea Nitrogen      8 - 25 mg/dL 26 (H)    Creatinine      0.40 - 1.60 mg/dL 2.60 (H)    EGFR      >60 mL/min/1.73m*2 28 (L)    Calcium      8.5 - 10.4 mg/dL 9.7    Albumin      3.5 - 5.0 g/dL 4.4    Alkaline Phosphatase      35 - 125 U/L 281 (H)    Total Protein      5.9 - 7.9 g/dL 7.3    AST      5 - 40 U/L 45 (H)    Bilirubin Total      0.1 - 1.2 mg/dL 0.5    ALT      5 - 40 U/L 45 (H)    Anion Gap      <=19 mmol/L 15       Lab Results   Component Value Date    HGBA1C 7.6 (H) 05/21/2024         PMH:  Past Medical History:   Diagnosis Date    Cardiac arrest (Multi)     Diabetes mellitus (Multi)     Foot pain, left     Hypertension     MI (myocardial infarction) (Multi)     Personal history of other diseases of the digestive system     History of esophageal reflux    Pneumonia         PSH:  Past Surgical History:   Procedure Laterality Date    COLONOSCOPY  05/10/2016    Complete Colonoscopy        Medications:    Current Outpatient Medications:     ciprofloxacin (Cipro) 500 mg tablet, Take 1 tablet (500 mg) by mouth every 12 hours., Disp: , Rfl:     hydroCHLOROthiazide (HYDRODiuril) 25 mg tablet, Take 1 tablet (25 mg) by mouth once daily., Disp: , Rfl:     Allergy:  Allergies   Allergen Reactions    Pollen Extracts Other     Sinus problems        Exam  obese  Testicles descended bilaterally, nontender, no masses  Vasa palpable bilaterally  Penis circ'd, no lesions, no plaques      Assessment/Plan  #Erectile Dysfunction: I discussed the etiology and different treatment modalities for erectile dysfunction. Specifically, we talked about lifestyle factors like smoking, diet, and exercise. We also discussed ED as a sign of systemic disease, and the contributions of elevated cholesterol and elevated blood sugars on erections. We then discussed treatment modalities, specifically PDE5 inhibitors, intracavernosal injections, vacuum erectile devices, and penile prostheses.    ED  panel  Trial of Cialis 20mg - medication counseling done. Can start with half tab. Discussed side effects including priapism, headaches, stuffiness, and back pain. Discussed that if he gets an erection >4 hours, he needs to present to the emergency room or risk worsening of his erectile dysfunction long term.     Fu in 2 months

## 2024-08-28 ENCOUNTER — APPOINTMENT (OUTPATIENT)
Dept: PODIATRY | Facility: CLINIC | Age: 54
End: 2024-08-28
Payer: MEDICARE

## 2024-09-06 DIAGNOSIS — R52 PAIN, UNSPECIFIED: ICD-10-CM

## 2024-09-06 DIAGNOSIS — E11.65 TYPE 2 DIABETES MELLITUS WITH HYPERGLYCEMIA (MULTI): Primary | ICD-10-CM

## 2024-09-11 ENCOUNTER — LAB (OUTPATIENT)
Dept: LAB | Facility: LAB | Age: 54
End: 2024-09-11
Payer: MEDICARE

## 2024-09-11 DIAGNOSIS — R52 PAIN, UNSPECIFIED: ICD-10-CM

## 2024-09-11 DIAGNOSIS — N52.9 ERECTILE DYSFUNCTION, UNSPECIFIED ERECTILE DYSFUNCTION TYPE: ICD-10-CM

## 2024-09-11 DIAGNOSIS — Z13.6 ENCOUNTER FOR LIPID SCREENING FOR CARDIOVASCULAR DISEASE: ICD-10-CM

## 2024-09-11 DIAGNOSIS — Z13.220 ENCOUNTER FOR LIPID SCREENING FOR CARDIOVASCULAR DISEASE: ICD-10-CM

## 2024-09-11 DIAGNOSIS — Z00.00 HEALTH MAINTENANCE EXAMINATION: ICD-10-CM

## 2024-09-11 DIAGNOSIS — E11.65 TYPE 2 DIABETES MELLITUS WITH HYPERGLYCEMIA (MULTI): ICD-10-CM

## 2024-09-11 LAB
ALBUMIN SERPL-MCNC: 4.3 G/DL (ref 3.5–5)
ALP BLD-CCNC: 418 U/L (ref 35–125)
ALT SERPL-CCNC: 50 U/L (ref 5–40)
ANION GAP SERPL CALC-SCNC: 12 MMOL/L
AST SERPL-CCNC: 41 U/L (ref 5–40)
BILIRUB SERPL-MCNC: 0.3 MG/DL (ref 0.1–1.2)
BUN SERPL-MCNC: 27 MG/DL (ref 8–25)
CALCIUM SERPL-MCNC: 9.7 MG/DL (ref 8.5–10.4)
CHLORIDE SERPL-SCNC: 103 MMOL/L (ref 97–107)
CHOLEST SERPL-MCNC: 244 MG/DL (ref 133–200)
CHOLEST/HDLC SERPL: 5.7 {RATIO}
CO2 SERPL-SCNC: 27 MMOL/L (ref 24–31)
CREAT SERPL-MCNC: 2.7 MG/DL (ref 0.4–1.6)
EGFRCR SERPLBLD CKD-EPI 2021: 27 ML/MIN/1.73M*2
ERYTHROCYTE [SEDIMENTATION RATE] IN BLOOD BY WESTERGREN METHOD: 64 MM/H (ref 0–20)
EST. AVERAGE GLUCOSE BLD GHB EST-MCNC: 166 MG/DL
FSH SERPL-ACNC: 7 IU/L
GLUCOSE SERPL-MCNC: 117 MG/DL (ref 65–99)
HBA1C MFR BLD: 7.4 %
HCT VFR BLD AUTO: 39.4 % (ref 41–52)
HDLC SERPL-MCNC: 43 MG/DL
LDLC SERPL CALC-MCNC: 154 MG/DL (ref 65–130)
LH SERPL-ACNC: 8.2 IU/L
POTASSIUM SERPL-SCNC: 4.7 MMOL/L (ref 3.4–5.1)
PROLACTIN SERPL-MCNC: 6.4 UG/L (ref 2–18)
PROT SERPL-MCNC: 7.5 G/DL (ref 5.9–7.9)
SODIUM SERPL-SCNC: 142 MMOL/L (ref 133–145)
TESTOST SERPL-MCNC: 556 NG/DL (ref 240–1000)
TRIGL SERPL-MCNC: 233 MG/DL (ref 40–150)
URATE SERPL-MCNC: 11.4 MG/DL (ref 3.6–7.7)

## 2024-09-11 PROCEDURE — 83002 ASSAY OF GONADOTROPIN (LH): CPT

## 2024-09-11 PROCEDURE — 80053 COMPREHEN METABOLIC PANEL: CPT

## 2024-09-11 PROCEDURE — 83036 HEMOGLOBIN GLYCOSYLATED A1C: CPT

## 2024-09-11 PROCEDURE — 83001 ASSAY OF GONADOTROPIN (FSH): CPT

## 2024-09-11 PROCEDURE — 84146 ASSAY OF PROLACTIN: CPT

## 2024-09-11 PROCEDURE — 84550 ASSAY OF BLOOD/URIC ACID: CPT

## 2024-09-11 PROCEDURE — 85652 RBC SED RATE AUTOMATED: CPT

## 2024-09-11 PROCEDURE — 84403 ASSAY OF TOTAL TESTOSTERONE: CPT

## 2024-09-11 PROCEDURE — 80061 LIPID PANEL: CPT

## 2024-09-11 PROCEDURE — 36415 COLL VENOUS BLD VENIPUNCTURE: CPT

## 2024-09-11 PROCEDURE — 85014 HEMATOCRIT: CPT

## 2024-09-17 ENCOUNTER — APPOINTMENT (OUTPATIENT)
Dept: UROLOGY | Facility: CLINIC | Age: 54
End: 2024-09-17
Payer: MEDICARE

## 2024-09-17 DIAGNOSIS — N52.9 ERECTILE DYSFUNCTION, UNSPECIFIED ERECTILE DYSFUNCTION TYPE: ICD-10-CM

## 2024-09-17 DIAGNOSIS — N18.4 CHRONIC KIDNEY DISEASE, STAGE 4 (SEVERE) (MULTI): Primary | ICD-10-CM

## 2024-09-17 PROCEDURE — 1036F TOBACCO NON-USER: CPT | Performed by: UROLOGY

## 2024-09-17 PROCEDURE — 99214 OFFICE O/P EST MOD 30 MIN: CPT | Performed by: UROLOGY

## 2024-09-17 RX ORDER — LOSARTAN POTASSIUM 50 MG/1
25 TABLET ORAL DAILY
Qty: 30 TABLET | Refills: 4 | Status: SHIPPED | OUTPATIENT
Start: 2024-09-17 | End: 2025-09-17

## 2024-09-17 RX ORDER — TADALAFIL 20 MG/1
20 TABLET ORAL AS NEEDED
Qty: 30 TABLET | Refills: 6 | Status: SHIPPED | OUTPATIENT
Start: 2024-09-17

## 2024-09-17 RX ORDER — DAPAGLIFLOZIN 5 MG/1
5 TABLET, FILM COATED ORAL EVERY 24 HOURS
Qty: 30 TABLET | Refills: 11 | Status: SHIPPED | OUTPATIENT
Start: 2024-09-17 | End: 2025-09-17

## 2024-09-17 RX ORDER — ATORVASTATIN CALCIUM 40 MG/1
40 TABLET, FILM COATED ORAL DAILY
Qty: 30 TABLET | Refills: 11 | Status: SHIPPED | OUTPATIENT
Start: 2024-09-17 | End: 2025-09-17

## 2024-09-17 RX ORDER — ALLOPURINOL 100 MG/1
TABLET ORAL
COMMUNITY
Start: 2024-09-16

## 2024-09-17 ASSESSMENT — PAIN SCALES - GENERAL: PAINLEVEL: 0-NO PAIN

## 2024-09-17 NOTE — PROGRESS NOTES
HPI:  53 y.o. male patient sp MI, with CKD and  DM complains of  erectile dysfunction. Seen commercial-self referral?  Last Visit 8/27/24:  -Cialis 20mg    Today's visit:  - doing great with half a tablet of 20 mg Cialis.  -No side effects.  -labs reviewed    Nephrology notes reviewed    Duration: years, worse after covid in 2021  Rigidity of erections: reports better when taking cialis  /10  Morning Erections: None  s/p prostatectomy: No, prostatitis 20 years ago  On nitrates/NTG?: No  Smoker? No  Partner: wife  Tried PDE5is?:   Viagra/sildenafil - response: never tried  Cialis/tadalafil- response: cialis 5, rationing pills due to cost  Tried penile injections: no  Libido/Desire: Good  Have been on Testosterone /anabolic steroids? No  Pain or curvature in penis when erect: No  Premature or delayed ejaculation:  None    Prev. Hospital admission for covid, had STEMI and cardiac arrest.    Lab Results   Component Value Date    TESTOSTERONE 556 09/11/2024     Lab Results   Component Value Date    LH 8.2 09/11/2024     Lab Results   Component Value Date    HCT 39.4 (L) 09/11/2024     Lab Results   Component Value Date    FSH 7.0 09/11/2024    LH 8.2 09/11/2024     Lab Results   Component Value Date    CHOL 244 (H) 09/11/2024    HDL 43.0 09/11/2024    CHHDL 5.7 09/11/2024    LDLCALC 154 (H) 09/11/2024    TRIG 233 (H) 09/11/2024     Lab Results   Component Value Date    PROLACTIN 6.4 09/11/2024        Component      Latest Ref Rng 5/21/2024   WBC      4.4 - 11.3 x10*3/uL 8.2    nRBC      0.0 - 0.0 /100 WBCs 0.0    RBC      4.50 - 5.90 x10*6/uL 4.71    HEMOGLOBIN      13.5 - 17.5 g/dL 13.0 (L)    HEMATOCRIT      41.0 - 52.0 % 39.9 (L)    MCV      80 - 100 fL 85    MCH      26.0 - 34.0 pg 27.6    MCHC      32.0 - 36.0 g/dL 32.6    RED CELL DISTRIBUTION WIDTH      11.5 - 14.5 % 14.1    Platelets      150 - 450 x10*3/uL 372    Neutrophils %      40.0 - 80.0 % 69.0    Immature Granulocytes %, Automated      0.0 - 0.9 % 0.2     Lymphocytes %      13.0 - 44.0 % 14.0    Monocytes %      2.0 - 10.0 % 14.1    Eosinophils %      0.0 - 6.0 % 1.8    Basophils %      0.0 - 2.0 % 0.9    Neutrophils Absolute      1.20 - 7.70 x10*3/uL 5.63    Immature Granulocytes Absolute, Automated      0.00 - 0.70 x10*3/uL 0.02    Lymphocytes Absolute      1.20 - 4.80 x10*3/uL 1.14 (L)    Monocytes Absolute      0.10 - 1.00 x10*3/uL 1.15 (H)    Eosinophils Absolute      0.00 - 0.70 x10*3/uL 0.15    Basophils Absolute      0.00 - 0.10 x10*3/uL 0.07    GLUCOSE      65 - 99 mg/dL 138 (H)    SODIUM      133 - 145 mmol/L 144    POTASSIUM      3.4 - 5.1 mmol/L 4.0    CHLORIDE      97 - 107 mmol/L 104    Bicarbonate      24 - 31 mmol/L 25    Blood Urea Nitrogen      8 - 25 mg/dL 26 (H)    Creatinine      0.40 - 1.60 mg/dL 2.60 (H)    EGFR      >60 mL/min/1.73m*2 28 (L)    Calcium      8.5 - 10.4 mg/dL 9.7    Albumin      3.5 - 5.0 g/dL 4.4    Alkaline Phosphatase      35 - 125 U/L 281 (H)    Total Protein      5.9 - 7.9 g/dL 7.3    AST      5 - 40 U/L 45 (H)    Bilirubin Total      0.1 - 1.2 mg/dL 0.5    ALT      5 - 40 U/L 45 (H)    Anion Gap      <=19 mmol/L 15       Lab Results   Component Value Date    HGBA1C 7.4 (H) 09/11/2024         PMH:  Past Medical History:   Diagnosis Date    Cardiac arrest (Multi)     Diabetes mellitus (Multi)     Foot pain, left     Hypertension     MI (myocardial infarction) (Multi)     Personal history of other diseases of the digestive system     History of esophageal reflux    Pneumonia         PSH:  Past Surgical History:   Procedure Laterality Date    COLONOSCOPY  05/10/2016    Complete Colonoscopy        Medications:    Current Outpatient Medications:     allopurinol (Zyloprim) 100 mg tablet, , Disp: , Rfl:     atenolol (Tenormin) 25 mg tablet, , Disp: , Rfl:     ciprofloxacin (Cipro) 500 mg tablet, Take 1 tablet (500 mg) by mouth every 12 hours., Disp: , Rfl:     hydroCHLOROthiazide (HYDRODiuril) 25 mg tablet, Take 1 tablet (25 mg)  by mouth once daily., Disp: , Rfl:     tadalafil (Cialis) 20 mg tablet, Take 1 tablet (20 mg) by mouth if needed for erectile dysfunction (Start with half tab. Take 2 hours prior to wanting erection.If you get an erection over 4 hours, go to the emergency room.)., Disp: 30 tablet, Rfl: 6    Allergy:  Allergies   Allergen Reactions    Pollen Extracts Other     Sinus problems        Exam  obese  Testicles descended bilaterally, nontender, no masses  Vasa palpable bilaterally  Penis circ'd, no lesions, no plaques      Assessment/Plan  #Erectile Dysfunction:     Continue Cialis 20mg -  he is taking half a tablet with excellent results.     Fu in 1 year with ROBYN.    Sharan Attestation  By signing my name below, I, Sharan Garnica attest that this documentation has been prepared under the direction and in the presence of Kitty Domínguez MD. All medical record entries made by the Scribe were at my direction or personally dictated by me. I have reviewed the chart and agree that the record accurately reflects my personal performance of the history, physical exam, discussion and plan.

## 2024-12-02 ENCOUNTER — APPOINTMENT (OUTPATIENT)
Dept: ORTHOPEDIC SURGERY | Facility: CLINIC | Age: 54
End: 2024-12-02
Payer: MEDICARE

## 2024-12-04 ENCOUNTER — HOSPITAL ENCOUNTER (OUTPATIENT)
Dept: RADIOLOGY | Facility: CLINIC | Age: 54
Discharge: HOME | End: 2024-12-04
Payer: MEDICARE

## 2024-12-04 ENCOUNTER — OFFICE VISIT (OUTPATIENT)
Dept: ORTHOPEDIC SURGERY | Facility: CLINIC | Age: 54
End: 2024-12-04
Payer: MEDICARE

## 2024-12-04 VITALS — HEIGHT: 66 IN | WEIGHT: 236 LBS | BODY MASS INDEX: 37.93 KG/M2

## 2024-12-04 DIAGNOSIS — M25.512 BILATERAL SHOULDER PAIN, UNSPECIFIED CHRONICITY: Primary | ICD-10-CM

## 2024-12-04 DIAGNOSIS — M75.51 BILATERAL SHOULDER BURSITIS: ICD-10-CM

## 2024-12-04 DIAGNOSIS — M19.011 PRIMARY OSTEOARTHRITIS OF BOTH SHOULDERS: ICD-10-CM

## 2024-12-04 DIAGNOSIS — M75.52 BILATERAL SHOULDER BURSITIS: ICD-10-CM

## 2024-12-04 DIAGNOSIS — M25.511 BILATERAL SHOULDER PAIN, UNSPECIFIED CHRONICITY: Primary | ICD-10-CM

## 2024-12-04 DIAGNOSIS — M19.012 PRIMARY OSTEOARTHRITIS OF BOTH SHOULDERS: ICD-10-CM

## 2024-12-04 DIAGNOSIS — R52 PAIN: ICD-10-CM

## 2024-12-04 PROCEDURE — 99213 OFFICE O/P EST LOW 20 MIN: CPT | Performed by: ORTHOPAEDIC SURGERY

## 2024-12-04 PROCEDURE — 73030 X-RAY EXAM OF SHOULDER: CPT | Mod: BILATERAL PROCEDURE | Performed by: ORTHOPAEDIC SURGERY

## 2024-12-04 PROCEDURE — 1036F TOBACCO NON-USER: CPT | Performed by: ORTHOPAEDIC SURGERY

## 2024-12-04 PROCEDURE — 73030 X-RAY EXAM OF SHOULDER: CPT | Mod: 50

## 2024-12-04 PROCEDURE — 3008F BODY MASS INDEX DOCD: CPT | Performed by: ORTHOPAEDIC SURGERY

## 2024-12-04 ASSESSMENT — PATIENT HEALTH QUESTIONNAIRE - PHQ9
SUM OF ALL RESPONSES TO PHQ9 QUESTIONS 1 AND 2: 0
2. FEELING DOWN, DEPRESSED OR HOPELESS: NOT AT ALL
1. LITTLE INTEREST OR PLEASURE IN DOING THINGS: NOT AT ALL

## 2024-12-04 ASSESSMENT — LIFESTYLE VARIABLES
AUDIT-C TOTAL SCORE: 0
AUDIT TOTAL SCORE: 0
HOW OFTEN DURING THE LAST YEAR HAVE YOU FOUND THAT YOU WERE NOT ABLE TO STOP DRINKING ONCE YOU HAD STARTED: NEVER
HOW OFTEN DO YOU HAVE A DRINK CONTAINING ALCOHOL: NEVER
HOW MANY STANDARD DRINKS CONTAINING ALCOHOL DO YOU HAVE ON A TYPICAL DAY: PATIENT DOES NOT DRINK
HOW OFTEN DURING THE LAST YEAR HAVE YOU HAD A FEELING OF GUILT OR REMORSE AFTER DRINKING: NEVER
HOW OFTEN DURING THE LAST YEAR HAVE YOU FAILED TO DO WHAT WAS NORMALLY EXPECTED FROM YOU BECAUSE OF DRINKING: NEVER
HAVE YOU OR SOMEONE ELSE BEEN INJURED AS A RESULT OF YOUR DRINKING: NO
HOW OFTEN DURING THE LAST YEAR HAVE YOU BEEN UNABLE TO REMEMBER WHAT HAPPENED THE NIGHT BEFORE BECAUSE YOU HAD BEEN DRINKING: NEVER
HAS A RELATIVE, FRIEND, DOCTOR, OR ANOTHER HEALTH PROFESSIONAL EXPRESSED CONCERN ABOUT YOUR DRINKING OR SUGGESTED YOU CUT DOWN: NO
HOW OFTEN DO YOU HAVE SIX OR MORE DRINKS ON ONE OCCASION: NEVER
SKIP TO QUESTIONS 9-10: 1
HOW OFTEN DURING THE LAST YEAR HAVE YOU NEEDED AN ALCOHOLIC DRINK FIRST THING IN THE MORNING TO GET YOURSELF GOING AFTER A NIGHT OF HEAVY DRINKING: NEVER

## 2024-12-04 ASSESSMENT — ENCOUNTER SYMPTOMS
DEPRESSION: 0
OCCASIONAL FEELINGS OF UNSTEADINESS: 0
LOSS OF SENSATION IN FEET: 0

## 2024-12-04 ASSESSMENT — PAIN DESCRIPTION - DESCRIPTORS: DESCRIPTORS: ACHING;SORE

## 2024-12-04 ASSESSMENT — PAIN - FUNCTIONAL ASSESSMENT: PAIN_FUNCTIONAL_ASSESSMENT: 0-10

## 2024-12-04 ASSESSMENT — PAIN SCALES - GENERAL
PAINLEVEL_OUTOF10: 8
PAINLEVEL_OUTOF10: 8

## 2024-12-04 ASSESSMENT — COLUMBIA-SUICIDE SEVERITY RATING SCALE - C-SSRS
1. IN THE PAST MONTH, HAVE YOU WISHED YOU WERE DEAD OR WISHED YOU COULD GO TO SLEEP AND NOT WAKE UP?: NO
2. HAVE YOU ACTUALLY HAD ANY THOUGHTS OF KILLING YOURSELF?: NO
6. HAVE YOU EVER DONE ANYTHING, STARTED TO DO ANYTHING, OR PREPARED TO DO ANYTHING TO END YOUR LIFE?: NO

## 2024-12-04 NOTE — PROGRESS NOTES
Subjective      Chief Complaint   Patient presents with    Right Shoulder - Pain    Left Shoulder - Pain        Past Surgical History:   Procedure Laterality Date    COLONOSCOPY  05/10/2016    Complete Colonoscopy        HPI  This 54 year old patient presents today with bilateral shoulder pain 10+. The patient states that the bilateral shoulder pain has been present for months. The patient denies trauma or injury. The patient states that the bilateral shoulder pain is worse with and aggravated by reaching and lifting. The patient states that this shoulder pain is disabling and presents today to discuss further options. The patient states that they have tried tylenol and ibuprofen with no relief.    CARDIOLOGY:   Negative for chest pain, shortness of breath.   RESPIRATORY:   Negative for chest pain, shortness of breath.   MUSCULOSKELETAL:   See HPI for details.   NEUROLOGY:   Negative for tingling, numbness, weakness.    Objective      There were no vitals taken for this visit.     SHOULDER EXAM  Constitutional: Appears stated age. No apparent distress  Labored Breathing: No  Psychiatric: Normal mood and effect.   Neurological: alert and oriented x3  Skin: intact  HEENT: No bruising, otorrhea, rhinorrhea.  MUSCULOSKELETAL: Neck: No tenderness. No pain or limitation with range of motion. Back: No tenderness. Straight leg test negative bilaterally. bilateral shoulder: There is tenderness anteriorly and laterally. Active abduction and active flexion are 0-125 degrees but with pain and guarding. There is pain with and limitation of active and passive internal and external rotation. Comparments are soft. Neurovascular is intact.     AP and lateral x-rays of the left and right shoulders done and read in office today show mild osteoarthritis of the right and left shoulders.       Kali was seen today for pain and pain.  Diagnoses and all orders for this visit:  Bilateral shoulder pain, unspecified chronicity  (Primary)  Bilateral shoulder bursitis  Primary osteoarthritis of both shoulders   Options are discussed with the patient in detail. The patient is given a prescription for physical therapy to evaluate and treat with gentle strengthening and ROM exercises with modalities as needed. The patient is instructed regarding activity modification and risk for further injury with falling or trauma, ice, physician directed at home gentle strengthening and ROM exercises, and the appropriate use of Tylenol as needed for pain with its potential adverse reactions and side effects. The patient understands. Please note that this report has been produced using speech recognition software.  It may contain errors related to grammar, punctuation or spelling.  Electronically signed, but not reviewed.    Jose C Noble MD

## 2024-12-04 NOTE — PATIENT INSTRUCTIONS
Thank you for coming to see us today!     Continue to use tylenol for pain control.   Rest, ice and elevate and remember to do exercises.   We are giving you a referral to physical therapy    Follow up  after PT is complete

## 2024-12-16 DIAGNOSIS — N18.4 CHRONIC KIDNEY DISEASE, STAGE 4 (SEVERE) (MULTI): Primary | ICD-10-CM

## 2024-12-17 ENCOUNTER — EVALUATION (OUTPATIENT)
Dept: PHYSICAL THERAPY | Facility: CLINIC | Age: 54
End: 2024-12-17
Payer: MEDICARE

## 2024-12-17 DIAGNOSIS — M25.512 BILATERAL SHOULDER PAIN, UNSPECIFIED CHRONICITY: ICD-10-CM

## 2024-12-17 DIAGNOSIS — M25.511 BILATERAL SHOULDER PAIN, UNSPECIFIED CHRONICITY: ICD-10-CM

## 2024-12-17 DIAGNOSIS — M19.011 PRIMARY OSTEOARTHRITIS OF BOTH SHOULDERS: ICD-10-CM

## 2024-12-17 DIAGNOSIS — M75.51 BILATERAL SHOULDER BURSITIS: ICD-10-CM

## 2024-12-17 DIAGNOSIS — M75.52 BILATERAL SHOULDER BURSITIS: ICD-10-CM

## 2024-12-17 DIAGNOSIS — M19.012 PRIMARY OSTEOARTHRITIS OF BOTH SHOULDERS: ICD-10-CM

## 2024-12-17 PROCEDURE — 97110 THERAPEUTIC EXERCISES: CPT | Mod: GP

## 2024-12-17 PROCEDURE — 97162 PT EVAL MOD COMPLEX 30 MIN: CPT | Mod: GP

## 2024-12-17 SDOH — ECONOMIC STABILITY: GENERAL: QUALITY OF LIFE: FAIR

## 2024-12-17 ASSESSMENT — ENCOUNTER SYMPTOMS
EXACERBATED BY: LIFTING
ALLEVIATING FACTORS: RELAXATION
ALLEVIATING FACTORS: SUPPORT
PAIN SCALE AT LOWEST: 5
PAIN SCALE AT HIGHEST: 10
EXACERBATED BY: OVERHEAD ACTIVITY
QUALITY: THROBBING
ALLEVIATING FACTORS: CHANGE IN POSITION
QUALITY: TIGHT
QUALITY: PRESSURE
QUALITY: DISCOMFORT
ALLEVIATING FACTORS: ICE
ALLEVIATING FACTORS: HEAT
QUALITY: DULL ACHE
QUALITY: RADIATING
ALLEVIATING FACTORS: MEDICATIONS
PAIN SCALE: 9
EXACERBATED BY: MOVEMENT
QUALITY: GRINDING

## 2024-12-17 NOTE — PROGRESS NOTES
Physical Therapy Evaluation and Treatment     Patient Name: Kali Montelongo  MRN: [id number]  Encounter date: 2024  Time Calculation  Start Time: 1015  Stop Time: 1105  Time Calculation (min): 50 min  PT Evaluation Time Entry  PT Evaluation (Moderate) Time Entry: 30  PT Therapeutic Procedures Time Entry  Therapeutic Exercise Time Entry: [id number]      Visit # 1 of 10 (Eval only)  Visits/Dates Authorized: 24:  EVAL ONLY - [employer] MEDICARE - AUTH THRU [employer] / $4500 OOP not met / $40 COPAY / AVAILITY [id number] / ds      Current Problem:   Problem List Items Addressed This Visit             ICD-10-CM    Bilateral shoulder pain M25.511, M25.512    Relevant Orders    Follow Up In Physical Therapy    Bilateral shoulder bursitis M75.51, M75.52    Relevant Orders    Follow Up In Physical Therapy    Primary osteoarthritis of both shoulders M19.011, M19.012    Relevant Orders    Follow Up In Physical Therapy     Precautions: None     Subjective Evaluation    History of Present Illness  Date of onset: 2024  Mechanism of injury: 2024 - pain b/l shldrs, R lateral cerv  Denies accident/injury  FINDINGS:  X-rays of the left and right shoulders done and read in the office  today show osteoarthritis of both the left and right shoulders (worse  on the left) with loss of joint surface cartilage at the glenohumeral  compartment.  No fracture or destruction is seen.      Quality of life: fair    Pain  Current pain ratin  At best pain ratin  At worst pain rating: 10  Location: R/L shldr, cerv  Quality: dull ache, throbbing, radiating, discomfort, grinding, pressure and tight  Relieving factors: ice, heat, medications, relaxation, change in position and support  Aggravating factors: lifting, movement and overhead activity  Progression: no change    Social Support  Lives in: multiple-level home  Lives with: spouse    Hand dominance: right    Diagnostic Tests  X-ray: abnormal  MRI studies:  abnormal    Treatments  Previous treatment: medication  Current treatment: medication  Patient Goals  Patient goals for therapy: decreased pain, increased motion, increased strength, independence with ADLs/IADLs and return to sport/leisure activities  Patient goal: Currently not working          Objective     Postural Observations  Seated posture: poor  Standing posture: poor  Correction of posture: makes symptoms better      Neurological Testing     Sensation     Shoulder   Left Shoulder   Intact: light touch    Right Shoulder   Intact: Light touch    Reflexes   Left   Deltoid (C5): normal (2+)  Biceps (C5/C6): normal (2+)  Brachioradialis (C6): normal (2+)  Triceps (C7): normal (2+)    Right   Deltoid (C5): normal (2+)  Biceps (C5/C6): normal (2+)  Brachioradialis (C6): normal (2+)  Triceps (C7): normal (2+)    Palpation   Left   Tenderness of the infraspinatus, latissimus, levator scapulae, pectoralis major, pectoralis minor, rhomboids, subclavius, subscapularis, supraspinatus and thoracic paraspinals.   Trigger point to infraspinatus, latissimus, levator scapulae, pectoralis major, pectoralis minor, rhomboids, subclavius, subscapularis, supraspinatus and thoracic paraspinals.     Right Tenderness of the latissimus, levator scapulae, pectoralis major, pectoralis minor, thoracic paraspinals and upper trapezius.   Trigger point to latissimus, levator scapulae, pectoralis major, pectoralis minor, thoracic paraspinals and upper trapezius.     Active Range of Motion   Left Shoulder   Flexion: WFL and with pain  Extension: WFL and with pain  Abduction: WFL and with pain  Adduction: WFL and with pain  Horizontal abduction: WFL and with pain  Horizontal adduction: WFL and with pain    Right Shoulder   Flexion: WFL and with pain  Extension: WFL and with pain  Abduction: WFL and with pain  Adduction: WFL and with pain  Horizontal abduction: WFL and with pain  Horizontal adduction: WFL and with pain    Scapular Mobility    Left Shoulder   Scapular mobility: fair  Scapular Mobility with Shoulder to 90° FF   Scapular winging: moderate  Scapular elevation: moderate    Right Shoulder   Scapular mobility: fair  Scapular Mobility with Shoulder to 90° FF   Scapular winging: moderate  Scapular elevation: moderate    Joint Play   Left Shoulder  Joints within functional limits are the anterior capsule, posterior capsule, inferior capsule, AC joint and SC joint. Hypomobile in the cervical spine and thoracic spine.    Right Shoulder  Joints within functional limits are the anterior capsule, posterior capsule, inferior capsule, AC joint and SC joint. Hypomobile in the cervical spine and thoracic spine.     Strength/Myotome Testing     Left Shoulder     Planes of Motion   Flexion: 4   Extension: 4   Abduction: 4   Adduction: 4   External rotation at 0°: 4     Right Shoulder     Planes of Motion   Flexion: 4   Extension: 4   Abduction: 4   Adduction: 4   External rotation at 0°: 4      Treatments:     Therapeutic Exercise 12562:     HEP / Access Codes:   Access Code: [email or web address]  URL: [email or web address]  Date: 12/17/2024  Prepared by: Juaquin Ralph    Exercises  - Seated Passive Cervical Retraction  - 1 x daily - 7 x weekly - 2 sets - 10 reps - 2 hold  - Seated Scapular Retraction  - 1 x daily - 7 x weekly - 2 sets - 10 reps - 2 hold  - Seated Levator Scapulae Stretch  - 1 x daily - 7 x weekly - 2 sets - 2 reps - 10 hold  - Gentle Levator Scapulae Stretch  - 1 x daily - 7 x weekly - 2 sets - 2 reps - 10 hold  - Seated Cervical Rotation AROM  - 1 x daily - 7 x weekly - 2 sets - 3 reps - 3 hold  - Doorway Pec Stretch at 90 Degrees Abduction  - 1 x daily - 7 x weekly - 2 sets - 2 reps - 10 hold      Assessment & Plan     Assessment  Impairments: abnormal or restricted ROM, activity intolerance, impaired physical strength, lacks appropriate home exercise program and pain with function  Prognosis: fair    Goals  Currently not  working    Plan  Therapy options: will be seen for skilled physical therapy services  Planned therapy interventions: body mechanics training, flexibility, functional ROM exercises, home exercise program, postural training, strengthening, stretching and therapeutic activities  Frequency: 2x week  Duration in visits: 10  Duration in weeks: 5  Treatment plan discussed with: patient       Moderate complexity due to patient's clinical presentation being evolving with changing characteristics, with comorbidities/complexities to include chronic UE/cerv, all of which may negatively impact rehab tolerance and progression.     Summary - Chronic cerv, shldr pain  Recommend - mobility and PRE program - cerv, R/L UE  Manual/Modalities - PRN    Goals:   Frequency - 2x/wk x 6 wks    Goals:  SHORT TERM GOALS:  Patient will report decrease in pain from **/10 to </= **/10 to improve quality of life.   Patient will improve CERV/UE AROM to WFL in order to improve Cerv/Shldr/Scap functional mobility.  Patient will demonstrate OH reaching/lifting/behind back x 10 without compensation to demonstrate improved UE strength and self care independence  Patient will improve UE functional score to 40-50%  of normal limits (affected vs unaffected)  Patient independent with prescribed HEP  Pt Education - Independent postural and  awareness and joint protection program  LONG TERM GOALS:  Patient will be independent with HEP to promote self management of condition  Patient will improve WNL AROM to within 90% pre injury/pre surgery status in order to improve control and regain functional mobility without restrictions.  Patient will perform self care, return to work/hobbies without restriction to demonstrate improved UE strength.   Patient will improve UE functional score to 60-70%  of normal limits (affected vs unaffected) - Functional Outcome Tool  Functional Level - reported % (hobbies/work/recreation)

## 2024-12-30 ENCOUNTER — TREATMENT (OUTPATIENT)
Dept: PHYSICAL THERAPY | Facility: CLINIC | Age: 54
End: 2024-12-30
Payer: MEDICARE

## 2024-12-30 DIAGNOSIS — M75.51 BILATERAL SHOULDER BURSITIS: ICD-10-CM

## 2024-12-30 DIAGNOSIS — M75.52 BILATERAL SHOULDER BURSITIS: ICD-10-CM

## 2024-12-30 DIAGNOSIS — M25.512 BILATERAL SHOULDER PAIN, UNSPECIFIED CHRONICITY: ICD-10-CM

## 2024-12-30 DIAGNOSIS — M19.012 PRIMARY OSTEOARTHRITIS OF BOTH SHOULDERS: ICD-10-CM

## 2024-12-30 DIAGNOSIS — M19.011 PRIMARY OSTEOARTHRITIS OF BOTH SHOULDERS: ICD-10-CM

## 2024-12-30 DIAGNOSIS — M25.511 BILATERAL SHOULDER PAIN, UNSPECIFIED CHRONICITY: ICD-10-CM

## 2024-12-30 PROCEDURE — 97110 THERAPEUTIC EXERCISES: CPT | Mod: GP,CQ

## 2024-12-30 ASSESSMENT — PAIN SCALES - GENERAL: PAINLEVEL_OUTOF10: 5 - MODERATE PAIN

## 2024-12-30 ASSESSMENT — PAIN DESCRIPTION - DESCRIPTORS: DESCRIPTORS: ACHING

## 2024-12-30 NOTE — PROGRESS NOTES
"Physical Therapy Treatment    Patient Name: Kali Montelongo  MRN: [id number]  Today's Date: 12/30/2024  Time Calculation  Start Time: 1347  Stop Time: 1432  Time Calculation (min): 45 min  PT Therapeutic Procedures Time Entry  Therapeutic Exercise Time Entry: 41    Insurance:  Visit number: 2 of 10  Authorization info: 12/16/24:  EVAL ONLY - [employer] MEDICARE - AUTH THRU [employer] / $4500 OOP not met / $40 COPAY / AVAILITY [id number] / ds    Insurance Type: Payor: [employer] MEDICARE / Plan: [employer] GOLD CHOICE / Product Type: *No Product type* /     Current Problem   1. Bilateral shoulder pain, unspecified chronicity  Follow Up In Physical Therapy      2. Bilateral shoulder bursitis  Follow Up In Physical Therapy      3. Primary osteoarthritis of both shoulders  Follow Up In Physical Therapy          Subjective   General   Reason for Referral: Bilateral shoulder pain M25.511, M25.512     Relevant Orders    Follow Up In Physical Therapy    Bilateral shoulder bursitis M75.51, M75.52    Relevant Orders    Follow Up In Physical Therapy    Primary osteoarthritis of both shoulders M19.011, M19.012  Referred By: Jose C Noble MD  General Comment: Pt reports minimal pain without activity, increased shoulder pain with activity.  Precautions:  Precautions  Precautions Comment: None  Pain   0-10 (Numeric) Pain Score: 5 - Moderate pain (Pain with movement, minimal pain with activity.)  Pain Type: Chronic pain  Pain Location: Shoulder  Pain Orientation: Left, Right  Pain Descriptors: Aching  Post Treatment Pain Level 5/10    Objective   Pt requires only occasional postural cueing during ther ex progressions this visit.    Treatments:  Therapeutic Exercise:  Therapeutic Exercise  Therapeutic Exercise Performed: Yes  Therapeutic Exercise Activity 1: UBE BWD/FWD 2'  Therapeutic Exercise Activity 2: Scap retraction 5\" hold 2x10  Therapeutic Exercise Activity 3: Rows L3 GTB 2x10  Therapeutic Exercise Activity 4: Sh extension L3 GTB " "2x10  Therapeutic Exercise Activity 5: ER L3 GTB 2x10 L/R each  Therapeutic Exercise Activity 6: IR L3 GTB 2x10 L/R each  Therapeutic Exercise Activity 7: Wall slides 2x10 L/R each  Therapeutic Exercise Activity 8: Wall walk up with L3 GTB  Therapeutic Exercise Activity 9: Doorway pec stretch 20\"x4  Therapeutic Exercise Activity 10: Upper trap [id number] 20\"x3 L/R each       Assessment   Assessment:   PT Assessment  PT Assessment Results:  (abnormal or restricted ROM, activity intolerance, impaired physical strength, lacks appropriate home exercise program and pain with function)  Rehab Prognosis: Fair  Evaluation/Treatment Tolerance: Patient tolerated treatment well  Assessment Comment: Fair postural awarement observed during ther ex progressions, tolerates treatment without increased pain level.    Plan:   OP PT Plan  Treatment/Interventions:  (body mechanics training, flexibility, functional ROM exercises, home exercise program, postural training, strengthening, stretching and therapeutic activities  Frequency: 2x week)  PT Plan: Skilled PT  PT Frequency: 2 times per week  Duration: 5 weeks  Number of Treatments Authorized: 10  Rehab Potential: Fair  Plan of Care Agreement: Patient    OP EDUCATION:  Outpatient Education  Individual(s) Educated: Patient  Education Provided: Body Mechanics, Anatomy, Home Exercise Program  Patient/Caregiver Demonstrated Understanding: yes  Patient Response to Education: Patient/Caregiver Verbalized Understanding of Information, Patient/Caregiver Performed Return Demonstration of Exercises/Activities, Patient/Caregiver Asked Appropriate Questions    Goals:     SHORT TERM GOALS:  Patient will report decrease in pain from **/10 to </= **/10 to improve quality of life.   Patient will improve CERV/UE AROM to WFL in order to improve Cerv/Shldr/Scap functional mobility.  Patient will demonstrate OH reaching/lifting/behind back x 10 without compensation to demonstrate improved UE strength " and self care independence  Patient will improve UE functional score to 40-50%  of normal limits (affected vs unaffected)  Patient independent with prescribed HEP  Pt Education - Independent postural and  awareness and joint protection program  LONG TERM GOALS:  Patient will be independent with HEP to promote self management of condition  Patient will improve WNL AROM to within 90% pre injury/pre surgery status in order to improve control and regain functional mobility without restrictions.  Patient will perform self care, return to work/hobbies without restriction to demonstrate improved UE strength.   Patient will improve UE functional score to 60-70%  of normal limits (affected vs unaffected) - Functional Outcome Tool  Functional Level - reported % (hobbies/work/recreation)

## 2025-01-06 ENCOUNTER — TREATMENT (OUTPATIENT)
Dept: PHYSICAL THERAPY | Facility: CLINIC | Age: 55
End: 2025-01-06
Payer: MEDICARE

## 2025-01-06 DIAGNOSIS — M19.012 PRIMARY OSTEOARTHRITIS OF BOTH SHOULDERS: ICD-10-CM

## 2025-01-06 DIAGNOSIS — M25.511 BILATERAL SHOULDER PAIN, UNSPECIFIED CHRONICITY: ICD-10-CM

## 2025-01-06 DIAGNOSIS — M19.011 PRIMARY OSTEOARTHRITIS OF BOTH SHOULDERS: ICD-10-CM

## 2025-01-06 DIAGNOSIS — M75.52 BILATERAL SHOULDER BURSITIS: ICD-10-CM

## 2025-01-06 DIAGNOSIS — M25.512 BILATERAL SHOULDER PAIN, UNSPECIFIED CHRONICITY: ICD-10-CM

## 2025-01-06 DIAGNOSIS — M75.51 BILATERAL SHOULDER BURSITIS: ICD-10-CM

## 2025-01-06 PROCEDURE — 97110 THERAPEUTIC EXERCISES: CPT | Mod: GP,CQ

## 2025-01-06 ASSESSMENT — PAIN SCALES - GENERAL: PAINLEVEL_OUTOF10: 5 - MODERATE PAIN

## 2025-01-06 ASSESSMENT — PAIN DESCRIPTION - DESCRIPTORS: DESCRIPTORS: ACHING

## 2025-01-13 ENCOUNTER — APPOINTMENT (OUTPATIENT)
Dept: PHYSICAL THERAPY | Facility: CLINIC | Age: 55
End: 2025-01-13
Payer: MEDICARE

## 2025-01-20 ENCOUNTER — TREATMENT (OUTPATIENT)
Dept: PHYSICAL THERAPY | Facility: CLINIC | Age: 55
End: 2025-01-20
Payer: MEDICARE

## 2025-01-20 DIAGNOSIS — M75.52 BILATERAL SHOULDER BURSITIS: ICD-10-CM

## 2025-01-20 DIAGNOSIS — M19.011 PRIMARY OSTEOARTHRITIS OF BOTH SHOULDERS: ICD-10-CM

## 2025-01-20 DIAGNOSIS — M25.511 BILATERAL SHOULDER PAIN, UNSPECIFIED CHRONICITY: ICD-10-CM

## 2025-01-20 DIAGNOSIS — M75.51 BILATERAL SHOULDER BURSITIS: ICD-10-CM

## 2025-01-20 DIAGNOSIS — M19.012 PRIMARY OSTEOARTHRITIS OF BOTH SHOULDERS: ICD-10-CM

## 2025-01-20 DIAGNOSIS — M25.512 BILATERAL SHOULDER PAIN, UNSPECIFIED CHRONICITY: ICD-10-CM

## 2025-01-20 PROCEDURE — 97110 THERAPEUTIC EXERCISES: CPT | Mod: GP,CQ

## 2025-01-20 ASSESSMENT — PAIN SCALES - GENERAL: PAINLEVEL_OUTOF10: 5 - MODERATE PAIN

## 2025-01-20 ASSESSMENT — PAIN DESCRIPTION - DESCRIPTORS: DESCRIPTORS: ACHING

## 2025-01-20 NOTE — PROGRESS NOTES
"Physical Therapy Treatment    Patient Name: Kali Montelongo  MRN: [id number]  Today's Date: 1/20/2025  Time Calculation  Start Time: 1301  Stop Time: 1347  Time Calculation (min): 46 min  PT Therapeutic Procedures Time Entry  Therapeutic Exercise Time Entry: 43    Insurance:  Visit number: 4 of 10  Authorization info: 12/16/24:  EVAL ONLY - [employer] MEDICARE - AUTH THRU [employer] / $4500 OOP not met / $40 COPAY / AV[employer]ILITY [id number] / ds    Insurance Type: Payor: [employer] MEDICARE / Plan: [employer] GOLD CHOICE / Product Type: *No Product type* /     Current Problem   1. Bilateral shoulder pain, unspecified chronicity  Follow Up In Physical Therapy      2. Bilateral shoulder bursitis  Follow Up In Physical Therapy      3. Primary osteoarthritis of both shoulders  Follow Up In Physical Therapy          Subjective   General   Reason for Referral: Bilateral shoulder pain M25.511, M25.512     Relevant Orders    Follow Up In Physical Therapy    Bilateral shoulder bursitis M75.51, M75.52    Relevant Orders    Follow Up In Physical Therapy    Primary osteoarthritis of both shoulders M19.011, M19.012  Referred By: Jose C Noble MD  General Comment: Pt reports 3/10 pain at rest, increases to 6/10 with activity.  Pt reports paulino has not had time to perform HEP activities.  Precautions:  Precautions  Precautions Comment: None  Pain   0-10 (Numeric) Pain Score: 5 - Moderate pain  Pain Type: Chronic pain  Pain Location: Shoulder  Pain Orientation: Right, Left  Pain Descriptors: Aching  Post Treatment Pain Level 3-5/10    Objective   AROM R shoulder flex 141 degrees, abduction 120 degrees, L shoulder flex 126 degrees, 114 degrees.    Treatments:  Therapeutic Exercise:  Therapeutic Exercise  Therapeutic Exercise Performed: Yes  Therapeutic Exercise Activity 1: UBE BWD/FWD 2' each  Therapeutic Exercise Activity 2: Pulleys flexion 5\" hold x20 L/R each  Therapeutic Exercise Activity 3: Scap retraction 5\" hold 2x10  Therapeutic Exercise Activity " 4: Rows L3 GTB 2x10  Therapeutic Exercise Activity 5: Sh extension L3 GTB 2x10  Therapeutic Exercise Activity 6: Supine AAROM shoulder flexion with wand 2x10  Therapeutic Exercise Activity 7: Sidelying ER 2x10 L/R each  Therapeutic Exercise Activity 8: Slide board L4 flex 2x10 L/R each  Therapeutic Exercise Activity 9: Supine bench press with wand+3# weight 2x10  Therapeutic Exercise Activity 10: Supine horizontal abduction, OTB, 2x10  Therapeutic Exercise Activity 11: SPO 2# weghts B 2x10       Assessment   Assessment:   PT Assessment  PT Assessment Results:  (abnormal or restricted ROM, activity intolerance, impaired physical strength, lacks appropriate home exercise program and pain with function)  Rehab Prognosis: Fair  Evaluation/Treatment Tolerance: Patient tolerated treatment well, Patient limited by pain  Assessment Comment: Pt maintains fair postural awareness during ther ex, however, AROM flex and abduction have decreased @ B shoulders.  Pt tolerates treatment without increased pain level .    Plan:   OP PT Plan  Treatment/Interventions:  (body mechanics training, flexibility, functional ROM exercises, home exercise program, postural training, strengthening, stretching and therapeutic activities  Frequency: 2x week)  PT Plan: Skilled PT  PT Frequency: 2 times per week  Duration: 5 weeks  Number of Treatments Authorized: 10  Rehab Potential: Fair  Plan of Care Agreement: Patient    OP EDUCATION:  Outpatient Education  Individual(s) Educated: Patient  Education Provided: Anatomy, Body Mechanics  Patient/Caregiver Demonstrated Understanding: yes  Patient Response to Education: Patient/Caregiver Verbalized Understanding of Information, Patient/Caregiver Performed Return Demonstration of Exercises/Activities, Patient/Caregiver Asked Appropriate Questions    Goals:     SHORT TERM GOALS:  Patient will report decrease in pain from **/10 to </= **/10 to improve quality of life.   Patient will improve CERV/UE AROM  to WFL in order to improve Cerv/Shldr/Scap functional mobility.  Patient will demonstrate OH reaching/lifting/behind back x 10 without compensation to demonstrate improved UE strength and self care independence  Patient will improve UE functional score to 40-50%  of normal limits (affected vs unaffected)  Patient independent with prescribed HEP  Pt Education - Independent postural and  awareness and joint protection program  LONG TERM GOALS:  Patient will be independent with HEP to promote self management of condition  Patient will improve WNL AROM to within 90% pre injury/pre surgery status in order to improve control and regain functional mobility without restrictions.  Patient will perform self care, return to work/hobbies without restriction to demonstrate improved UE strength.   Patient will improve UE functional score to 60-70%  of normal limits (affected vs unaffected) - Functional Outcome Tool  Functional Level - reported % (hobbies/work/recreation)

## 2025-01-27 ENCOUNTER — TREATMENT (OUTPATIENT)
Dept: PHYSICAL THERAPY | Facility: CLINIC | Age: 55
End: 2025-01-27
Payer: MEDICARE

## 2025-01-27 DIAGNOSIS — M19.012 PRIMARY OSTEOARTHRITIS OF BOTH SHOULDERS: ICD-10-CM

## 2025-01-27 DIAGNOSIS — M25.511 BILATERAL SHOULDER PAIN, UNSPECIFIED CHRONICITY: ICD-10-CM

## 2025-01-27 DIAGNOSIS — M75.51 BILATERAL SHOULDER BURSITIS: ICD-10-CM

## 2025-01-27 DIAGNOSIS — M25.512 BILATERAL SHOULDER PAIN, UNSPECIFIED CHRONICITY: ICD-10-CM

## 2025-01-27 DIAGNOSIS — M19.011 PRIMARY OSTEOARTHRITIS OF BOTH SHOULDERS: ICD-10-CM

## 2025-01-27 DIAGNOSIS — M75.52 BILATERAL SHOULDER BURSITIS: ICD-10-CM

## 2025-01-27 PROCEDURE — 97110 THERAPEUTIC EXERCISES: CPT | Mod: GP,CQ

## 2025-01-27 ASSESSMENT — PAIN SCALES - GENERAL: PAINLEVEL_OUTOF10: 5 - MODERATE PAIN

## 2025-01-27 NOTE — PROGRESS NOTES
"Physical Therapy Treatment    Patient Name: Kali Montelongo  MRN: [id number]  Today's Date: 1/27/2025  Time Calculation  Start Time: 1306  Stop Time: 1347  Time Calculation (min): 41 min  PT Therapeutic Procedures Time Entry  Therapeutic Exercise Time Entry: 40    Insurance:  Visit number: 5 of 10  Authorization info: 12/16/24:  EVAL ONLY - [employer] MEDICARE - AUTH THRU [employer] / $4500 OOP not met / $40 COPAY / AV[employer]ILITY [id number] / ds    Insurance Type: Payor: [employer] MEDICARE / Plan: [employer] GOLD CHOICE / Product Type: *No Product type* /     Current Problem   1. Bilateral shoulder pain, unspecified chronicity  Follow Up In Physical Therapy      2. Bilateral shoulder bursitis  Follow Up In Physical Therapy      3. Primary osteoarthritis of both shoulders  Follow Up In Physical Therapy          Subjective   General   Reason for Referral: Bilateral shoulder pain M25.511, M25.512     Relevant Orders    Follow Up In Physical Therapy    Bilateral shoulder bursitis M75.51, M75.52    Relevant Orders    Follow Up In Physical Therapy    Primary osteoarthritis of both shoulders M19.011, M19.012  Referred By: Jose C Noble MD  General Comment: Pt reports not having enough time to perform HEP.  Precautions:  Precautions  Precautions Comment: None  Pain   0-10 (Numeric) Pain Score: 5 - Moderate pain  Pain Type: Chronic pain  Pain Location: Shoulder  Pain Orientation: Right, Left  Pain Descriptors: Aching, Discomfort, Nagging, Tender, Tightness  Post Treatment Pain Level 4/10    Objective   Pt requires decreased postural cueing during ther ex.    Treatments:  Therapeutic Exercise:  Therapeutic Exercise  Therapeutic Exercise Performed: Yes  Therapeutic Exercise Activity 1: UBE BWD/FWD 2' each  Therapeutic Exercise Activity 2: Pulleys flexion 5\" hold x20 L/R each  Therapeutic Exercise Activity 3: Scap retraction 5\" hold 2x10  Therapeutic Exercise Activity 4: Rows L4 BTB 2x10  Therapeutic Exercise Activity 5: Sh extension L4 GBTB " 2x10  Therapeutic Exercise Activity 6: Supine AAROM shoulder flexion with wand 2x10  Therapeutic Exercise Activity 7: Sidelying ER 2x10 n1# L/R each  Therapeutic Exercise Activity 8: Slide board L4 flex 2x10 L/R each  Therapeutic Exercise Activity 9: Supine bench press with wand+3# weight 2x10  Therapeutic Exercise Activity 10: Supine horizontal abduction, GTB, 2x10       Assessment   Assessment:   PT Assessment  PT Assessment Results:  (abnormal or restricted ROM, activity intolerance, impaired physical strength, lacks appropriate home exercise program and pain with function)  Rehab Prognosis: Fair  Evaluation/Treatment Tolerance: Patient tolerated treatment well  Assessment Comment: Improving postural awareness requires fewer postural cues during ther ex.    Plan:   OP PT Plan  Treatment/Interventions:  (body mechanics training, flexibility, functional ROM exercises, home exercise program, postural training, strengthening, stretching and therapeutic activities  Frequency: 2x week)  PT Plan: Skilled PT  PT Frequency: 2 times per week  Duration: 5 weeks  Number of Treatments Authorized: 10  Rehab Potential: Fair  Plan of Care Agreement: Patient    OP EDUCATION:  Outpatient Education  Individual(s) Educated: Patient  Education Provided: Body Mechanics, Anatomy  Patient/Caregiver Demonstrated Understanding: yes  Patient Response to Education: Patient/Caregiver Verbalized Understanding of Information, Patient/Caregiver Performed Return Demonstration of Exercises/Activities, Patient/Caregiver Asked Appropriate Questions        Goals:    SHORT TERM GOALS:  Patient will report decrease in pain from **/10 to </= **/10 to improve quality of life.   Patient will improve CERV/UE AROM to WFL in order to improve Cerv/Shldr/Scap functional mobility.  Patient will demonstrate OH reaching/lifting/behind back x 10 without compensation to demonstrate improved UE strength and self care independence  Patient will improve UE functional  score to 40-50%  of normal limits (affected vs unaffected)  Patient independent with prescribed HEP  Pt Education - Independent postural and  awareness and joint protection program  LONG TERM GOALS:  Patient will be independent with HEP to promote self management of condition  Patient will improve WNL AROM to within 90% pre injury/pre surgery status in order to improve control and regain functional mobility without restrictions.  Patient will perform self care, return to work/hobbies without restriction to demonstrate improved UE strength.   Patient will improve UE functional score to 60-70%  of normal limits (affected vs unaffected) - Functional Outcome Tool  Functional Level - reported % (hobbies/work/recreation)

## 2025-02-03 ENCOUNTER — APPOINTMENT (OUTPATIENT)
Dept: PHYSICAL THERAPY | Facility: CLINIC | Age: 55
End: 2025-02-03
Payer: MEDICARE

## 2025-02-04 ENCOUNTER — APPOINTMENT (OUTPATIENT)
Dept: PHYSICAL THERAPY | Facility: CLINIC | Age: 55
End: 2025-02-04
Payer: MEDICARE

## 2025-02-04 DIAGNOSIS — M25.512 BILATERAL SHOULDER PAIN, UNSPECIFIED CHRONICITY: Primary | ICD-10-CM

## 2025-02-04 DIAGNOSIS — M25.511 BILATERAL SHOULDER PAIN, UNSPECIFIED CHRONICITY: Primary | ICD-10-CM

## 2025-02-04 NOTE — PROGRESS NOTES
Physical Therapy                 Therapy Communication Note    Patient Name: Kali Montelongo  MRN: [id number]  Department:   Room: Room/bed info not found  Today's Date: 2/4/2025     Discipline: Physical Therapy          Missed Visit Reason:  pt rescheduled    Missed Time: Cancel    Comment:

## 2025-02-10 ENCOUNTER — DOCUMENTATION (OUTPATIENT)
Dept: PHYSICAL THERAPY | Facility: CLINIC | Age: 55
End: 2025-02-10

## 2025-02-10 ENCOUNTER — APPOINTMENT (OUTPATIENT)
Dept: PHYSICAL THERAPY | Facility: CLINIC | Age: 55
End: 2025-02-10
Payer: MEDICARE

## 2025-02-10 NOTE — PROGRESS NOTES
Physical Therapy                 Therapy Communication Note    Patient Name: Kali Montelongo  MRN: [id number]  Department:   Room: Room/bed info not found  Today's Date: 2/10/2025     Discipline: Physical Therapy          Missed Visit Reason:  Patient did not show for scheduled appointment.  Called patient who states he forgot, he did reschedule to Feb 24th due to his availability.    Missed Time: No Show    Comment:

## 2025-02-25 ENCOUNTER — DOCUMENTATION (OUTPATIENT)
Dept: PHYSICAL THERAPY | Facility: CLINIC | Age: 55
End: 2025-02-25
Payer: MEDICARE

## 2025-02-25 NOTE — PROGRESS NOTES
Physical Therapy                 Therapy Communication Note    Patient Name: Kali Montelongo  MRN: [id number]  Department:   Room: Room/bed info not found  Today's Date: 2/25/2025     Discipline: Physical Therapy    Missed Time: No Show    Comment: Currently last scheduled. Has missed last several visits despite rescheduling for time to work with him.

## 2025-04-15 LAB
25(OH)D3+25(OH)D2 SERPL-MCNC: 21 NG/ML (ref 30–100)
ALBUMIN SERPL-MCNC: 4.6 G/DL (ref 3.6–5.1)
APPEARANCE UR: CLEAR
BACTERIA #/AREA URNS HPF: ABNORMAL /HPF
BILIRUB UR QL STRIP: NEGATIVE
BUN SERPL-MCNC: 26 MG/DL (ref 7–25)
BUN/CREAT SERPL: 11 (CALC) (ref 6–22)
CALCIUM SERPL-MCNC: 9.7 MG/DL (ref 8.6–10.3)
CHLORIDE SERPL-SCNC: 104 MMOL/L (ref 98–110)
CO2 SERPL-SCNC: 29 MMOL/L (ref 20–32)
COLOR UR: YELLOW
CREAT SERPL-MCNC: 2.32 MG/DL (ref 0.7–1.3)
CREAT UR-MCNC: 125 MG/DL (ref 20–320)
EGFRCR SERPLBLD CKD-EPI 2021: 32 ML/MIN/1.73M2
GLUCOSE SERPL-MCNC: 98 MG/DL (ref 65–99)
GLUCOSE UR QL STRIP: NEGATIVE
HGB UR QL STRIP: NEGATIVE
HYALINE CASTS #/AREA URNS LPF: ABNORMAL /LPF
KETONES UR QL STRIP: NEGATIVE
LEUKOCYTE ESTERASE UR QL STRIP: NEGATIVE
NITRITE UR QL STRIP: NEGATIVE
PH UR STRIP: 6.5 [PH] (ref 5–8)
PHOSPHATE SERPL-MCNC: 4.3 MG/DL (ref 2.5–4.5)
POTASSIUM SERPL-SCNC: 4.8 MMOL/L (ref 3.5–5.3)
PROT UR QL STRIP: ABNORMAL
PROT UR-MCNC: 122 MG/DL (ref 5–25)
PROT/CREAT UR: 0.98 MG/MG CREAT (ref 0.03–0.15)
PROT/CREAT UR: 976 MG/G CREAT (ref 25–148)
PTH-INTACT SERPL-MCNC: 119 PG/ML (ref 16–77)
RBC #/AREA URNS HPF: ABNORMAL /HPF
SERVICE CMNT-IMP: ABNORMAL
SODIUM SERPL-SCNC: 142 MMOL/L (ref 135–146)
SP GR UR STRIP: 1.01 (ref 1–1.03)
SQUAMOUS #/AREA URNS HPF: ABNORMAL /HPF
WBC #/AREA URNS HPF: ABNORMAL /HPF

## 2025-05-13 DIAGNOSIS — L30.9 ECZEMA, UNSPECIFIED TYPE: Primary | ICD-10-CM

## 2025-05-13 DIAGNOSIS — N18.4 CHRONIC KIDNEY DISEASE, STAGE 4 (SEVERE) (MULTI): ICD-10-CM

## 2025-05-13 RX ORDER — LOSARTAN POTASSIUM 50 MG/1
50 TABLET ORAL DAILY
Qty: 90 TABLET | Refills: 2 | Status: SHIPPED | OUTPATIENT
Start: 2025-05-13 | End: 2026-11-04

## 2025-05-13 RX ORDER — ERGOCALCIFEROL 1.25 MG/1
1.25 CAPSULE ORAL
Qty: 4 CAPSULE | Refills: 1 | Status: SHIPPED | OUTPATIENT
Start: 2025-05-13 | End: 2026-01-08

## 2025-05-13 RX ORDER — ATENOLOL 25 MG/1
25 TABLET ORAL DAILY
Qty: 90 TABLET | Refills: 2 | Status: SHIPPED | OUTPATIENT
Start: 2025-05-13

## 2025-06-16 DIAGNOSIS — G47.30 SLEEP APNEA, UNSPECIFIED TYPE: Primary | ICD-10-CM

## 2025-09-15 ENCOUNTER — APPOINTMENT (OUTPATIENT)
Dept: UROLOGY | Facility: CLINIC | Age: 55
End: 2025-09-15
Payer: MEDICARE